# Patient Record
Sex: FEMALE | Race: WHITE | Employment: FULL TIME | URBAN - METROPOLITAN AREA
[De-identification: names, ages, dates, MRNs, and addresses within clinical notes are randomized per-mention and may not be internally consistent; named-entity substitution may affect disease eponyms.]

---

## 2017-01-13 NOTE — PROGRESS NOTES
46 y.o. white female who presents for RPE    She's trying her best with the diet, still not much exercise. Weight is actually going up as below    Vitals 1/17/2017 8/9/2016 2/8/2016   Weight 219 lb 218 lb 210 lb     Denies GI or  issues. She's intol of 4 statins and has significant Fhx heart disease. The zetia is not getting her to target, pcsk9 not covered by insurance    Denies polyuria, polydipsia, nocturia, vision change. Not checking sugars at this time.   She started metformin at the last visit and no sfx to report    Past Medical History   Diagnosis Date    Allergic rhinitis      s/p desensitization Dr. Rafael Livingston of shoulder, left, adhesive     Cervical disc herniation      C5-C7 Dr. Trujillo Backbone Cervical spondylosis     Chondromalacia of right patella     CTS (carpal tunnel syndrome)      right    Depression     FHx: heart disease     Gastritis      Dr. Amber Estevez EGD 2009 w gr 1 esophagitis    GERD (gastroesophageal reflux disease)      s/p dilation Dr. Amber Estevez 2012    Hyperlipidemia      famillial hypercholesterolemia    Hypertension     IFG (impaired fasting glucose) 12/13    Kidney stones     Mixed headache 10/11    Morbid obesity (HCC)      peak weight 220 lbs, bmi 37.9 from 9/13; qsymia (9/13-3/15) and lost approx 15% wt; belviq no help; not contrave candidate due to narc use    Myofascial pain     Osteopenia     Plantar fasciitis      left heel    Prediabetes      on metformin 11/15    Sleep apnea 2007     and PLMD Dr. Costello Bone now seeing Dr Marion Christensen; AHI 20, on APAP for years, BiPAP titration 10/15 optimal pressure 11/7 cm      Past Surgical History   Procedure Laterality Date    Hx appendectomy      Hx cholecystectomy      Hx gyn       cervical surgery    Pr cardiac surg procedure unlist  2004     thallium negative    Hx urological       s/p urethral dilation    Hx knee arthroscopy  10/13     Right knee    Hx orthopaedic  2012     R CT release Dr. Sousa Hx colonoscopy       Dr. 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      Social History Main Topics    Smoking status: Former Smoker    Smokeless tobacco: Never Used    Alcohol use No    Drug use: No    Sexual activity: Not on file     Other Topics Concern    Not on file     Social History Narrative     Allergies   Allergen Reactions    Crestor [Rosuvastatin] Myalgia    Lipitor [Atorvastatin] Other (comments)     Feels \"cramps\"    Pravastatin Myalgia    Sulfa (Sulfonamide Antibiotics) Itching    Verapamil Other (comments)     Bad taste, headache    Zocor [Simvastatin] Myalgia     Current Outpatient Prescriptions   Medication Sig    ezetimibe (ZETIA) 10 mg tablet Take 1 Tab by mouth daily.  evolocumab (REPATHA SURECLICK) pen injection 1 mL by SubCUTAneous route every fourteen (14) days.  metFORMIN ER (GLUCOPHAGE XR) 500 mg tablet Take 2 Tabs by mouth daily (with dinner).  lisinopril-hydrochlorothiazide (PRINZIDE, ZESTORETIC) 10-12.5 mg per tablet TAKE 1 TABLET BY MOUTH EVERY DAY    valACYclovir (VALTREX) 1 gram tablet TAKE 1 TABLET BY MOUTH TWICE A DAY    traMADol (ULTRAM) 50 mg tablet Take 1 Tab by mouth every six (6) hours as needed for Pain. Max Daily Amount: 200 mg.  cholecalciferol, vitamin D3, (VITAMIN D3) 2,000 unit tab Take  by mouth daily.  esomeprazole (NEXIUM) 40 mg capsule Take 20 mg by mouth daily.  gabapentin (NEURONTIN) 300 mg tablet Take 300 mg by mouth two (2) times a day. Patient takes 900mg in AM and 1200mg in PM    DULoxetine (CYMBALTA) 60 mg capsule Take 30 mg by mouth daily.  Armodafinil (NUVIGIL) 250 mg Tab Take 150 mg by mouth daily.      No current facility-administered medications for this visit.      REVIEW OF SYSTEMS: FNP Duard Shows 3/13, mammo 2013, colo 12/12 Dr Caesar Rosales  Ophtho - no vision change or eye pain  Cardiac - no CP, PND, orthopnea, edema, palpitations or syncope  Resp - no wheezing, chronic coughing, dyspnea  GI - no heartburn, nausea, vomiting, change in bowel habits, bleeding, hemorrhoids  Urinary - no dysuria, hematuria, flank pain, urgency, frequency  Genitals - no genital lesions, discharge, masses, ulceration, warts  Ortho - no swelling, dec ROM, myalgias  Derm - no nail abnormalities, rashes, lesions of note, hair loss  Psych - denies any anxiety or depression symptoms, no hallucinations or violent ideation  Constitutional - no wt loss, night sweats, unexplained fevers  Endo - no polyuria, polydipsia, nocturia, hot flashes    Visit Vitals    /80    Pulse 98    Temp 98.6 °F (37 °C) (Oral)    Ht 5' 4\" (1.626 m)    Wt 219 lb (99.3 kg)    SpO2 98%    BMI 37.59 kg/m2     Affect is appropriate. Mood stable  A&O x3  No apparent distress  Sinuses tender to percussison w fairly boggy mucosa  Op w plaques bilaterally, shotty nontender LN noted submandibular areas  Anicteric, no JVD, adenopathy or thyromegaly. No carotid bruits or radiated murmur  Lungs clear to auscultation, no wheezes or rales  Heart showed regular rate and rhythm. No murmur, rubs, gallops  Abdomen soft nontender, no hepatosplenomegaly or masses. Extremities without edema.   Pulses 1-2+ symmetrically    LABS  From 5/10 showed   gluc 93,   cr 0.90,              alt 39,                        chol 195, tg 98,   hdl 72, ldl-c 103,  wbc 7.4, hb 12.4, plt 246, tsh 1.23  From 9/11 showed   gluc 95,   cr 0.62, gfr 107, alt 25,           ldl-p 1617,                                                         wbc 6.5, hb 12.3, plt 295, nikia neg, ra neg, esr 11, vit d 44.2  From 11/11 showed gluc 84,   cr 0.63, gfr 106,                    ck 51, navya 4.0  From 4/12 showed   gluc 94,   cr 0.69,      alt 26,           ldl-p 1197, chol 176, tg 59,   hdl 73, ldl-c 91  From 7/12 showed   gluc 78,   cr 0.80, gfr>60,  alt 47  From 5/13 showed                   ldl-p 985,   chol 202, tg 46,  hdl 74, ldl-c 119  From 12/13 showed gluc 102, cr 0.78, gfr 89,   alt 23,                chol 258, tg 95,   hdl 57, ldl-c 182,  wbc 6.3, hb 12.4, plt 311, ua neg  From 4/14 showed         hba1c 6.2,      chol 183, tg 90,   hdl 66, ldl-c 99  From 10/14 showed         hba1c 5.7,      chol 262, tg 79,   hdl 58, ldl-c 188   From 3/15 showed   gluc 91,   cr 0.69, gfr 101, alt 13,       chol 226, tg 78,   hdl 67, ldl-c 143  From 7/15 showed         hba1c 5.9,      chol 265, tg 95,   hdl 68, ldl-c 178  From 9/15 showed   gluc 100, cr 0.62, gfr>60,  alt 12,                   wbc 7.1, hb 12.8, plt 286, tsh 0.78, proBNP 49  From 11/15 showed gluc 92,   cr 0.75, gfr 92,   alt 22, hba1c 6.3,      chol 267, tg 93,   hdl 74, ldl-c 174,         nikia neg, rf neg, esr 3, uric 5.5  From 7/16 showed   gluc 96,   cr 0.66, gfr>60,  alt 40, hba1c 5.8,      chol 319, tg 116, hdl 68, ldl-c 228    Results for orders placed or performed during the hospital encounter of 12/17/16   LIPID PANEL   Result Value Ref Range    LIPID PROFILE          Cholesterol, total 295 (H) <200 MG/DL    Triglyceride 121 <150 MG/DL    HDL Cholesterol 69 (H) 40 - 60 MG/DL    LDL, calculated 201.8 (H) 0 - 100 MG/DL    VLDL, calculated 24.2 MG/DL    CHOL/HDL Ratio 4.3 0 - 5.0     HEMOGLOBIN A1C W/O EAG   Result Value Ref Range    Hemoglobin A1c 5.9 (H) 4.2 - 5.6 %   METABOLIC PANEL, COMPREHENSIVE   Result Value Ref Range    Sodium 140 136 - 145 mmol/L    Potassium 4.4 3.5 - 5.5 mmol/L    Chloride 105 100 - 108 mmol/L    CO2 27 21 - 32 mmol/L    Anion gap 8 3.0 - 18 mmol/L    Glucose 98 74 - 99 mg/dL    BUN 16 7.0 - 18 MG/DL    Creatinine 0.72 0.6 - 1.3 MG/DL    BUN/Creatinine ratio 22 (H) 12 - 20      GFR est AA >60 >60 ml/min/1.73m2    GFR est non-AA >60 >60 ml/min/1.73m2    Calcium 9.1 8.5 - 10.1 MG/DL Bilirubin, total 0.4 0.2 - 1.0 MG/DL    ALT 47 13 - 56 U/L    AST 22 15 - 37 U/L    Alk. phosphatase 87 45 - 117 U/L    Protein, total 7.0 6.4 - 8.2 g/dL    Albumin 3.7 3.4 - 5.0 g/dL    Globulin 3.3 2.0 - 4.0 g/dL    A-G Ratio 1.1 0.8 - 1.7       Patient Active Problem List   Diagnosis Code    Myofascial pain syndrome Dr. Breanna Amaro M79.1    FER/PLMD Dr. Pravin Rivers 2007 now Dr. Murphy Soto AHI 20 on BiPAP G47.33    Depression F32.9    IFG (impaired fasting glucose) R73.01    Essential hypertension I10    Gastroesophageal reflux disease without esophagitis K21.9    Ureteral stone N20.1    Familial hypercholesterolemia E78.01    Obesity (BMI 30-39. 9) E66.9     ASSESSMENT AND PLAN  1. Obesity. Lifestyle and dietary modification, exercise. Declined med supervised wt loss, bariatrics  2. Depression. Continue current  3. Hypertension. Continue current  4.  Cervical disc disease, myofascial pain syndrome. F/U with her specialists, continue current  5. FER/PLMD.  F/U Dr. Murphy Soto  6. Familial hyperchol. Trial of pitava although no guarantees it will be covered or that she'll even tolerate  7. IFG. Wt loss, dietary and lifestyle measures. Continue current regimen. RTC 7/17    Above conditions discussed at length and patient vocalized understanding.   All questions answered to patient satifaction

## 2017-01-17 ENCOUNTER — HOSPITAL ENCOUNTER (OUTPATIENT)
Dept: LAB | Age: 52
Discharge: HOME | End: 2017-01-17
Payer: COMMERCIAL

## 2017-01-17 ENCOUNTER — OFFICE VISIT (OUTPATIENT)
Dept: INTERNAL MEDICINE CLINIC | Age: 52
End: 2017-01-17

## 2017-01-17 VITALS
DIASTOLIC BLOOD PRESSURE: 80 MMHG | SYSTOLIC BLOOD PRESSURE: 120 MMHG | HEART RATE: 98 BPM | OXYGEN SATURATION: 98 % | TEMPERATURE: 98.6 F | WEIGHT: 219 LBS | BODY MASS INDEX: 37.39 KG/M2 | HEIGHT: 64 IN

## 2017-01-17 DIAGNOSIS — M79.18 MYOFASCIAL PAIN: ICD-10-CM

## 2017-01-17 DIAGNOSIS — R73.01 IFG (IMPAIRED FASTING GLUCOSE): ICD-10-CM

## 2017-01-17 DIAGNOSIS — I10 ESSENTIAL HYPERTENSION: ICD-10-CM

## 2017-01-17 DIAGNOSIS — Z00.00 PHYSICAL EXAM: Primary | ICD-10-CM

## 2017-01-17 DIAGNOSIS — Z11.59 NEED FOR HEPATITIS C SCREENING TEST: ICD-10-CM

## 2017-01-17 DIAGNOSIS — E66.9 OBESITY (BMI 30-39.9): ICD-10-CM

## 2017-01-17 DIAGNOSIS — E78.01 FAMILIAL HYPERCHOLESTEROLEMIA: ICD-10-CM

## 2017-01-17 PROCEDURE — 86803 HEPATITIS C AB TEST: CPT | Performed by: INTERNAL MEDICINE

## 2017-01-17 PROCEDURE — 36415 COLL VENOUS BLD VENIPUNCTURE: CPT | Performed by: INTERNAL MEDICINE

## 2017-01-17 NOTE — PROGRESS NOTES
1. Have you been to the ER, urgent care clinic or hospitalized since your last visit? NO.     2. Have you seen or consulted any other health care providers outside of the [facility] since your last visit (Include any pap smears or colon screening)? NO      Do you have an Advanced Directive? NO    Would you like information on Advanced Directives?  YES

## 2017-01-17 NOTE — MR AVS SNAPSHOT
Visit Information Date & Time Provider Department Dept. Phone Encounter #  
 1/17/2017  1:45 PM Jennifer Hinojosa MD Internist of [address] [id number] Your Appointments 5/18/2017  2:00 PM  
Office Visit with Jennifer Hinojosa MD  
Internist of [address] [place] [address] [facility]) Appt Note: 4 month follow up  
 [address] [facility], [address] [place] [address] [facility]  
  
   
 [address] [facility] Date Due Hepatitis C Screening 1965 DTaP/Tdap/Td series (1 - Tdap) 1/2/1991 PAP AKA CERVICAL CYTOLOGY 7/17/2016 INFLUENZA AGE 9 TO ADULT 8/1/2016 BREAST CANCER SCRN MAMMOGRAM 8/19/2018 COLONOSCOPY 2/17/2022 Allergies as of 1/17/2017  Review Complete On: 8/9/2016 By: Jennifer Hinojosa MD  
  
 Severity Noted Reaction Type Reactions Crestor [Rosuvastatin]  10/24/2014    Myalgia Lipitor [Atorvastatin]    Other (comments) Feels \"cramps\" Pravastatin  11/18/2015    Myalgia Sulfa (Sulfonamide Antibiotics)    Itching Verapamil    Other (comments) Bad taste, headache Zocor [Simvastatin]  07/09/2015    Myalgia Current Immunizations  Reviewed on 4/16/2014 Name Date Influenza Vaccine 10/5/2014, 12/1/2013 Pneumococcal Polysaccharide (PPSV-23) 12/18/2015 10:39 AM  
 TD Vaccine 1/1/1991 Not reviewed this visit You Were Diagnosed With   
  
 Codes Comments Need for hepatitis C screening test    -  Primary ICD-10-CM: Z11.59 
ICD-9-CM: V73.89 Vitals BP Pulse Temp Height(growth percentile) Weight(growth percentile) SpO2  
 120/80 98 98.6 °F (37 °C) (Oral) 5' 4\" (1.626 m) 219 lb (99.3 kg) 98% BMI OB Status Smoking Status 37.59 kg/m2 Having regular periods Former Smoker Vitals History BMI and BSA Data Body Mass Index Body Surface Area  
 37.59 kg/m 2 2.12 m 2 Preferred Pharmacy Pharmacy Name Phone [id number] [id number] [phone] Your Updated Medication List  
  
   
This list is accurate as of: 1/17/17  2:39 PM.  Always use your most recent med list.  
  
  
  
  
 CYMBALTA 60 mg capsule Generic drug:  DULoxetine Take 30 mg by mouth daily. evolocumab pen injection Commonly known as:  Jackie Dominion 1 mL by SubCUTAneous route every fourteen (14) days. ezetimibe 10 mg tablet Commonly known as:  Lizeth Bautista Take 1 Tab by mouth daily. gabapentin 300 mg tablet Commonly known as:  NEURONTIN Take 300 mg by mouth two (2) times a day. Patient takes 900mg in AM and 1200mg in PM  
  
 lisinopril-hydroCHLOROthiazide 10-12.5 mg per tablet Commonly known as:  PRINZIDE, ZESTORETIC  
TAKE 1 TABLET BY MOUTH EVERY DAY  
  
 metFORMIN  mg tablet Commonly known as:  GLUCOPHAGE XR Take 2 Tabs by mouth daily (with dinner). NexIUM 40 mg capsule Generic drug:  esomeprazole Take 20 mg by mouth daily. NUVIGIL 250 mg Tab tablet Generic drug:  Armodafinil Take 150 mg by mouth daily. traMADol 50 mg tablet Commonly known as:  ULTRAM  
Take 1 Tab by mouth every six (6) hours as needed for Pain. Max Daily Amount: 200 mg.  
  
 valACYclovir 1 gram tablet Commonly known as:  VALTREX  
TAKE 1 TABLET BY MOUTH TWICE A DAY  
  
 VITAMIN D3 2,000 unit Tab Generic drug:  cholecalciferol (vitamin D3) Take  by mouth daily. To-Do List   
 03/04/2017 10:00 AM  
  Appointment with [facility] RM 2 at [address] [place] [address] ([phone]) GENERAL INSTRUCTIONS  Bring information (ID card) if you have any medically implanted devices. You will be required to lie still while the procedure is being performed. Remove any jewelry (including body piercing, hairpins) prior to MRI.   If you have had a creatinine level drawn within the past 30 days, please bring most recent results to your appt.  Bring any films, CD's, and reports related to your study with you on the day of your exam.  This only includes studies done outside of [address] [place] [address], [facility], [place] and [place]. Bring a complete list of all medications you are currently taking to include prescriptions, over-the-counter meds, herbals, vitamins & any dietary supplements. If you were given medications for claustrophobia or anxiety, please arrange to have someone drive you to your appointment. QUESTIONS  Notify the MRI Department if you have any questions concerning your study. [place] - [phone] [address] [phone] [facility]! Dear Tabby Mcnulty: Thank you for requesting a [employer] account. Our records indicate that you already have an active [employer] account. You can access your account anytime at [email or web address] true[x] [email or web address] Did you know that you can access your hospital and ER discharge instructions at any time in [employer]? You can also review all of your test results from your hospital stay or ER visit. Additional Information If you have questions, please visit the Frequently Asked Questions section of the [employer] website at [email or web address][x] [email or web address] Remember, [employer] is NOT to be used for urgent needs. For medical emergencies, dial 911. Now available from your iPhone and Android! Please provide this summary of care documentation to your next provider. Your primary care clinician is listed as Shant Fleming. If you have any questions after today's visit, please call [phone].

## 2017-01-18 LAB
HCV AB SER IA-ACNC: 0.02 INDEX
HCV AB SERPL QL IA: NEGATIVE
HCV COMMENT,HCGAC: NORMAL

## 2017-01-19 ENCOUNTER — TELEPHONE (OUTPATIENT)
Dept: INTERNAL MEDICINE CLINIC | Age: 52
End: 2017-01-19

## 2017-01-19 NOTE — TELEPHONE ENCOUNTER
CAMPOS, just wanted to let her know that her RX for Livalo was approved by plan from 1/19/17-1/19/2018.

## 2017-01-19 NOTE — PROGRESS NOTES
Tried to do PA, they require a letter of medical necessity to be sent in by MD. Dr Hamida Link wrote a letter, faxed to plan for approval

## 2017-03-04 ENCOUNTER — HOSPITAL ENCOUNTER (OUTPATIENT)
Age: 52
Discharge: HOME | End: 2017-03-04
Attending: OTOLARYNGOLOGY
Payer: COMMERCIAL

## 2017-03-04 DIAGNOSIS — R93.0 ABNORMAL HEAD MRI: ICD-10-CM

## 2017-03-04 PROCEDURE — 70553 MRI BRAIN STEM W/O & W/DYE: CPT

## 2017-03-04 PROCEDURE — 74011250636 HC RX REV CODE- 250/636: Performed by: OTOLARYNGOLOGY

## 2017-03-04 PROCEDURE — A9585 GADOBUTROL INJECTION: HCPCS | Performed by: OTOLARYNGOLOGY

## 2017-03-04 RX ORDER — SODIUM CHLORIDE 0.9 % (FLUSH) 0.9 %
5-10 SYRINGE (ML) INJECTION
Status: DISCONTINUED | OUTPATIENT
Start: 2017-03-04 | End: 2017-03-08 | Stop reason: HOSPADM

## 2017-03-04 RX ADMIN — GADOBUTROL 10 ML: 604.72 INJECTION INTRAVENOUS at 10:30

## 2017-03-04 RX ADMIN — Medication 10 ML: at 12:00

## 2017-04-17 DIAGNOSIS — R73.01 IFG (IMPAIRED FASTING GLUCOSE): ICD-10-CM

## 2017-04-17 RX ORDER — METFORMIN HYDROCHLORIDE 500 MG/1
1000 TABLET, EXTENDED RELEASE ORAL
Qty: 60 TAB | Refills: 5 | Status: SHIPPED | OUTPATIENT
Start: 2017-04-17 | End: 2017-10-18 | Stop reason: SDUPTHER

## 2017-06-01 ENCOUNTER — OFFICE VISIT (OUTPATIENT)
Dept: INTERNAL MEDICINE CLINIC | Age: 52
End: 2017-06-01

## 2017-06-17 ENCOUNTER — HOSPITAL ENCOUNTER (OUTPATIENT)
Dept: LAB | Age: 52
Discharge: HOME | End: 2017-06-17
Payer: COMMERCIAL

## 2017-06-17 DIAGNOSIS — R73.01 IFG (IMPAIRED FASTING GLUCOSE): ICD-10-CM

## 2017-06-17 DIAGNOSIS — E78.01 FAMILIAL HYPERCHOLESTEROLEMIA: ICD-10-CM

## 2017-06-17 LAB
ALBUMIN SERPL BCP-MCNC: 3.7 G/DL (ref 3.4–5)
ALBUMIN/GLOB SERPL: 1.2 {RATIO} (ref 0.8–1.7)
ALP SERPL-CCNC: 76 U/L (ref 45–117)
ALT SERPL-CCNC: 35 U/L (ref 13–56)
AST SERPL W P-5'-P-CCNC: 16 U/L (ref 15–37)
BILIRUB DIRECT SERPL-MCNC: <0.1 MG/DL (ref 0–0.2)
BILIRUB SERPL-MCNC: 0.2 MG/DL (ref 0.2–1)
CHOLEST SERPL-MCNC: 278 MG/DL
GLOBULIN SER CALC-MCNC: 3 G/DL (ref 2–4)
HBA1C MFR BLD: 6 % (ref 4.2–5.6)
HDLC SERPL-MCNC: 63 MG/DL (ref 40–60)
HDLC SERPL: 4.4 {RATIO} (ref 0–5)
LDLC SERPL CALC-MCNC: 191.4 MG/DL (ref 0–100)
LIPID PROFILE,FLP: ABNORMAL
PROT SERPL-MCNC: 6.7 G/DL (ref 6.4–8.2)
TRIGL SERPL-MCNC: 118 MG/DL (ref ?–150)
VLDLC SERPL CALC-MCNC: 23.6 MG/DL

## 2017-06-17 PROCEDURE — 80061 LIPID PANEL: CPT | Performed by: INTERNAL MEDICINE

## 2017-06-17 PROCEDURE — 80076 HEPATIC FUNCTION PANEL: CPT | Performed by: INTERNAL MEDICINE

## 2017-06-17 PROCEDURE — 83036 HEMOGLOBIN GLYCOSYLATED A1C: CPT | Performed by: INTERNAL MEDICINE

## 2017-06-17 PROCEDURE — 36415 COLL VENOUS BLD VENIPUNCTURE: CPT | Performed by: INTERNAL MEDICINE

## 2017-06-21 PROBLEM — E78.5 HYPERLIPIDEMIA: Status: ACTIVE | Noted: 2017-06-21

## 2017-06-22 NOTE — PROGRESS NOTES
46 y.o. white female who presents for RPE    She's trying her best with the diet, still not much exercise. She has not fairly frequent BLACK when climbing up the stairs. No cp, pnd, edema, coughing, wheezing. Very concerned with her strong Fhx chd.      Vitals 6/23/2017 1/17/2017 8/9/2016 2/8/2016   Weight 220 lb 219 lb 218 lb 210 lb     Denies GI or  issues. She's overdue to see NP Tae Islas    She's intol of 4 statins, zetia did not get her to target, livalo too expensive, she did get the pcsk9 briefly but too expensive so did not stay on it    Denies polyuria, polydipsia, nocturia, vision change. Not checking sugars at this time.       Past Medical History:   Diagnosis Date    Allergic rhinitis     s/p desensitization Dr. Tom Colin Bursitis of shoulder, left, adhesive     Cervical disc herniation     C5-C7 Dr. Micki Mesa Cervical spondylosis     Chondromalacia of right patella     CTS (carpal tunnel syndrome)     right    Depression     FHx: heart disease     Gastritis     Dr. Tobi Benítez EGD 2009 w gr 1 esophagitis    GERD (gastroesophageal reflux disease)     s/p dilation Dr. Tobi Benítez 2012    Hyperlipidemia     possible famillial hypercholesterolemia    Hypertension     Kidney stones     Mixed headache 10/11    Morbid obesity ([address]     peak weight 220 lbs, bmi 37.9 from 9/13; qsymia (9/13-3/15) and lost approx 15% wt; belviq no help; not contrave candidate due to narc use    Myofascial pain     Osteopenia     Plantar fasciitis     left heel    Prediabetes     on metformin 11/15    Sleep apnea 2007    and PLMD Dr. Dhruv Chowdary now seeing Dr Guillermo Lopez; AHI 20, on APAP for years, BiPAP titration 10/15 optimal pressure 11/7 cm      Past Surgical History:   Procedure Laterality Date    CARDIAC SURG PROCEDURE UNLIST  2004    thallium negative    [place] COLONOSCOPY      Dr. Tobi Benítez 12/12 negative    HX GI  2006    CT abd/pelvis negative    HX GYN      cervical surgery    HX KNEE ARTHROSCOPY  10/13    Right knee    HX ORTHOPAEDIC  2012    R CT release Dr. 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    Smoking status: Former Smoker    Smokeless tobacco: Never Used    Alcohol use No    Drug use: No    Sexual activity: Not on file     Other Topics Concern    Not on file     Social History Narrative     Allergies   Allergen Reactions    Crestor [Rosuvastatin] Myalgia    Lipitor [Atorvastatin] Other (comments)     Feels \"cramps\"    Pravastatin Myalgia    Sulfa (Sulfonamide Antibiotics) Itching    Verapamil Other (comments)     Bad taste, headache    Zocor [Simvastatin] Myalgia     Current Outpatient Prescriptions   Medication Sig    metFORMIN ER (GLUCOPHAGE XR) 500 mg tablet Take 2 Tabs by mouth daily (with dinner).  lisinopril-hydrochlorothiazide (PRINZIDE, ZESTORETIC) 10-12.5 mg per tablet TAKE 1 TABLET BY MOUTH EVERY DAY    valACYclovir (VALTREX) 1 gram tablet TAKE 1 TABLET BY MOUTH TWICE A DAY    traMADol (ULTRAM) 50 mg tablet Take 1 Tab by mouth every six (6) hours as needed for Pain. Max Daily Amount: 200 mg.  cholecalciferol, vitamin D3, (VITAMIN D3) 2,000 unit tab Take  by mouth daily.  esomeprazole (NEXIUM) 40 mg capsule Take 20 mg by mouth daily.  gabapentin (NEURONTIN) 300 mg tablet Take 300 mg by mouth two (2) times a day. Patient takes 900mg in AM and 1200mg in PM    DULoxetine (CYMBALTA) 60 mg capsule Take 30 mg by mouth daily.  Armodafinil (NUVIGIL) 250 mg Tab Take 150 mg by mouth daily. No current facility-administered medications for this visit.       REVIEW OF SYSTEMS: FNP Osmanirian Niece 3/13, mammo 8/16, colo 12/12 Dr Ashraf Doing  Ophtho - no vision change or eye pain  Cardiac - no CP, PND, orthopnea, edema, palpitations or syncope  Resp - no wheezing, chronic coughing, dyspnea  GI - no heartburn, nausea, vomiting, change in bowel habits, bleeding, hemorrhoids  Urinary - no dysuria, hematuria, flank pain, urgency, frequency  Genitals - no genital lesions, discharge, masses, ulceration, warts  Ortho - no swelling, dec ROM, myalgias  Derm - no nail abnormalities, rashes, lesions of note, hair loss  Psych - denies any anxiety or depression symptoms, no hallucinations or violent ideation  Constitutional - no wt loss, night sweats, unexplained fevers  Endo - no polyuria, polydipsia, nocturia, hot flashes    Visit Vitals    /68    Pulse 90    Temp 98.3 °F (36.8 °C) (Oral)    Ht 5' 4\" (1.626 m)    Wt 220 lb (99.8 kg)    SpO2 97%    BMI 37.76 kg/m2     Affect is appropriate. Mood stable  A&O x3  No apparent distress  Sinuses tender to percussison w fairly boggy mucosa  Op w plaques bilaterally, shotty nontender LN noted submandibular areas  Anicteric, no JVD, adenopathy or thyromegaly. No carotid bruits or radiated murmur  Lungs clear to auscultation, no wheezes or rales  Heart showed regular rate and rhythm. No murmur, rubs, gallops  Abdomen soft nontender, no hepatosplenomegaly or masses. Extremities without edema.   Pulses 1-2+ symmetrically    LABS  From 5/10 showed   gluc 93,   cr 0.90,              alt 39,                        chol 195, tg 98,   hdl 72, ldl-c 103,  wbc 7.4, hb 12.4, plt 246, tsh 1.23  From 9/11 showed   gluc 95,   cr 0.62, gfr 107, alt 25,           ldl-p 1617,                                                         wbc 6.5, hb 12.3, plt 295, nikia neg, ra neg, esr 11, vit d 44.2  From 11/11 showed gluc 84,   cr 0.63, gfr 106,                    ck 51, navya 4.0  From 4/12 showed   gluc 94,   cr 0.69,      alt 26,           ldl-p 1197, chol 176, tg 59,   hdl 73, ldl-c 91  From 7/12 showed   gluc 78,   cr 0.80, gfr>60,  alt 47  From 5/13 showed ldl-p 985,   chol 202, tg 46,  hdl 74, ldl-c 119  From 12/13 showed gluc 102, cr 0.78, gfr 89,   alt 23,                chol 258, tg 95,   hdl 57, ldl-c 182,  wbc 6.3, hb 12.4, plt 311, ua neg  From 4/14 showed         hba1c 6.2,      chol 183, tg 90,   hdl 66, ldl-c 99  From 10/14 showed         hba1c 5.7,      chol 262, tg 79,   hdl 58, ldl-c 188   From 3/15 showed   gluc 91,   cr 0.69, gfr 101, alt 13,       chol 226, tg 78,   hdl 67, ldl-c 143  From 7/15 showed         hba1c 5.9,      chol 265, tg 95,   hdl 68, ldl-c 178  From 9/15 showed   gluc 100, cr 0.62, gfr>60,  alt 12,                   wbc 7.1, hb 12.8, plt 286, tsh 0.78, proBNP 49  From 11/15 showed gluc 92,   cr 0.75, gfr 92,   alt 22, hba1c 6.3,      chol 267, tg 93,   hdl 74, ldl-c 174,         nikia neg, rf neg, esr 3, uric 5.5  From 12/15 showed                     ua neg   From 7/16 showed   gluc 96,   cr 0.66, gfr>60,  alt 40, hba1c 5.8,      chol 319, tg 116, hdl 68, ldl-c 228,           From 1/17 showed   gluc 98,   cr 0.72, gfr>60,  alt 47, hba1c 5.9,      chol 295, tg 121, hdl 69, ldl-c 202,         hep c neg    Results for orders placed or performed during the hospital encounter of 06/17/17   LIPID PANEL   Result Value Ref Range    LIPID PROFILE          Cholesterol, total 278 (H) <200 MG/DL    Triglyceride 118 <150 MG/DL    HDL Cholesterol 63 (H) 40 - 60 MG/DL    LDL, calculated 191.4 (H) 0 - 100 MG/DL    VLDL, calculated 23.6 MG/DL    CHOL/HDL Ratio 4.4 0 - 5.0     HEPATIC FUNCTION PANEL   Result Value Ref Range    Protein, total 6.7 6.4 - 8.2 g/dL    Albumin 3.7 3.4 - 5.0 g/dL    Globulin 3.0 2.0 - 4.0 g/dL    A-G Ratio 1.2 0.8 - 1.7      Bilirubin, total 0.2 0.2 - 1.0 MG/DL    Bilirubin, direct <0.1 0.0 - 0.2 MG/DL    Alk.  phosphatase 76 45 - 117 U/L    AST (SGOT) 16 15 - 37 U/L    ALT (SGPT) 35 13 - 56 U/L   HEMOGLOBIN A1C W/O EAG   Result Value Ref Range    Hemoglobin A1c 6.0 (H) 4.2 - 5.6 %     EKG showed nsr, nl intervals, no acute changes    Patient Active Problem List   Diagnosis Code    Myofascial pain syndrome Dr. Roseanne Colon M79.1    FER/PLMD Dr. Brent Rosas 2007 now Dr. Elizabeth Barrera AHI 20 on BiPAP G47.33    Depression F32.9    IFG (impaired fasting glucose) R73.01    Essential hypertension I10    Gastroesophageal reflux disease without esophagitis K21.9    Ureteral stone N20.1    Obesity (BMI 30-39. 9) E66.9    Hyperlipidemia E78.5     ASSESSMENT AND PLAN  1. Obesity. Lifestyle and dietary modification, exercise. Declined med supervised wt loss, bariatrics  2. Depression. Continue current  3. Hypertension. Continue current  4.  Cervical disc disease, myofascial pain syndrome. F/U with her specialists, continue current  5. FER/PLMD.  F/U Dr. Elizabeth Barrera  6. Familial hypercholesterolemia, probable. Apparently new programs for repatha, will see if she qualifies  7. IFG. Wt loss, dietary and lifestyle measures. Continue current regimen. 8.  Exertional dyspnea. Stress thallium scheduled. Asa 81 qd        RTC 11/17    Above conditions discussed at length and patient vocalized understanding.   All questions answered to patient satifaction

## 2017-06-23 ENCOUNTER — OFFICE VISIT (OUTPATIENT)
Dept: INTERNAL MEDICINE CLINIC | Age: 52
End: 2017-06-23

## 2017-06-23 VITALS
TEMPERATURE: 98.3 F | HEIGHT: 64 IN | SYSTOLIC BLOOD PRESSURE: 120 MMHG | DIASTOLIC BLOOD PRESSURE: 68 MMHG | HEART RATE: 90 BPM | OXYGEN SATURATION: 97 % | BODY MASS INDEX: 37.56 KG/M2 | WEIGHT: 220 LBS

## 2017-06-23 DIAGNOSIS — I10 ESSENTIAL HYPERTENSION: Primary | ICD-10-CM

## 2017-06-23 DIAGNOSIS — E66.9 OBESITY (BMI 30-39.9): ICD-10-CM

## 2017-06-23 DIAGNOSIS — G47.33 OSA (OBSTRUCTIVE SLEEP APNEA): ICD-10-CM

## 2017-06-23 DIAGNOSIS — R06.09 EXERTIONAL DYSPNEA: ICD-10-CM

## 2017-06-23 DIAGNOSIS — R73.01 IFG (IMPAIRED FASTING GLUCOSE): ICD-10-CM

## 2017-06-23 DIAGNOSIS — E78.01 FAMILIAL HYPERCHOLESTEROLEMIA: ICD-10-CM

## 2017-06-23 NOTE — PROGRESS NOTES
1. Have you been to the ER, urgent care clinic or hospitalized since your last visit? NO.     2. Have you seen or consulted any other health care providers outside of the [address] [place] [address] since your last visit (Include any pap smears or colon screening)? NO      Do you have an Advanced Directive? NO    Would you like information on Advanced Directives?  YES

## 2017-06-23 NOTE — MR AVS SNAPSHOT
Visit Information Date & Time Provider Department Dept. Phone Encounter #  
 6/23/2017  1:00 PM Deng Taylor MD Internist of [address] [id number] Your Appointments 12/20/2017 10:20 AM  
Office Visit with Deng Taylor MD  
Internist of [address] [facility]) Appt Note: 6 mo f/u  
 [address] [facility], [address] [address] [address] [place]  
  
   
 [address], [address] Upcoming Health Maintenance Date Due DTaP/Tdap/Td series (1 - Tdap) 1/2/1991 PAP AKA CERVICAL CYTOLOGY 7/17/2016 INFLUENZA AGE 9 TO ADULT 8/1/2017 BREAST CANCER SCRN MAMMOGRAM 8/19/2018 COLONOSCOPY 2/17/2022 Allergies as of 6/23/2017  Review Complete On: 6/21/2017 By: Deng Taylor MD  
  
 Severity Noted Reaction Type Reactions Crestor [Rosuvastatin]  10/24/2014    Myalgia Lipitor [Atorvastatin]    Other (comments) Feels \"cramps\" Pravastatin  11/18/2015    Myalgia Sulfa (Sulfonamide Antibiotics)    Itching Verapamil    Other (comments) Bad taste, headache Zocor [Simvastatin]  07/09/2015    Myalgia Current Immunizations  Reviewed on 6/21/2017 Name Date Influenza Vaccine 10/1/2016, 10/1/2015, 10/5/2014, 12/1/2013 Pneumococcal Polysaccharide (PPSV-23) 12/18/2015 10:39 AM  
 TD Vaccine 1/1/1991 Reviewed by Deng Taylor MD on 6/21/2017 at 10:37 PM  
You Were Diagnosed With   
  
 Codes Comments Essential hypertension    -  Primary ICD-10-CM: I10 
ICD-9-CM: 401.9 Vitals BP Pulse Temp Height(growth percentile) Weight(growth percentile) SpO2  
 120/68 90 98.3 °F (36.8 °C) (Oral) 5' 4\" (1.626 m) 220 lb (99.8 kg) 97% BMI OB Status Smoking Status 37.76 kg/m2 Having regular periods Former Smoker Vitals History BMI and BSA Data Body Mass Index Body Surface Area  
 37.76 kg/m 2 2.12 m 2 Preferred Pharmacy Pharmacy Name Phone [id number] Mccallum [address] [phone] Your Updated Medication List  
  
   
This list is accurate as of: 6/23/17  2:08 PM.  Always use your most recent med list.  
  
  
  
  
 CYMBALTA 60 mg capsule Generic drug:  DULoxetine Take 30 mg by mouth daily. gabapentin 300 mg tablet Commonly known as:  NEURONTIN Take 300 mg by mouth two (2) times a day. Patient takes 900mg in AM and 1200mg in PM  
  
 lisinopril-hydroCHLOROthiazide 10-12.5 mg per tablet Commonly known as:  PRINZIDE, ZESTORETIC  
TAKE 1 TABLET BY MOUTH EVERY DAY  
  
 metFORMIN  mg tablet Commonly known as:  GLUCOPHAGE XR Take 2 Tabs by mouth daily (with dinner). NexIUM 40 mg capsule Generic drug:  esomeprazole Take 20 mg by mouth daily. NUVIGIL 250 mg Tab tablet Generic drug:  Armodafinil Take 150 mg by mouth daily. traMADol 50 mg tablet Commonly known as:  ULTRAM  
Take 1 Tab by mouth every six (6) hours as needed for Pain. Max Daily Amount: 200 mg.  
  
 valACYclovir 1 gram tablet Commonly known as:  VALTREX  
TAKE 1 TABLET BY MOUTH TWICE A DAY  
  
 VITAMIN D3 2,000 unit Tab Generic drug:  cholecalciferol (vitamin D3) Take  by mouth daily. We Performed the Following AMB POC EKG ROUTINE W/ 12 LEADS, INTER & REP [30246 CPT(R)] Introducing [facility]! Dear Yuliana Don: Thank you for requesting a [facility] account. Our records indicate that you already have an active [facility] account. You can access your account anytime at [email or web address] Did you know that you can access your hospital and ER discharge instructions at any time in [facility]? You can also review all of your test results from your hospital stay or ER visit. Additional Information If you have questions, please visit the Frequently Asked Questions section of the [employer] website at [email or web address] Remember, [facility] [employer] is NOT to be used for urgent needs. For medical emergencies, dial 911. Now available from your iPhone and Android! Please provide this summary of care documentation to your next provider. Your primary care clinician is listed as Addis Curran. If you have any questions after today's visit, please call [phone].

## 2017-07-03 ENCOUNTER — HOSPITAL ENCOUNTER (OUTPATIENT)
Dept: NON INVASIVE DIAGNOSTICS | Age: 52
Discharge: HOME | End: 2017-07-03
Attending: INTERNAL MEDICINE
Payer: COMMERCIAL

## 2017-07-03 DIAGNOSIS — R06.09 EXERTIONAL DYSPNEA: ICD-10-CM

## 2017-07-03 LAB
ATTENDING PHYSICIAN, CST07: NORMAL
DIAGNOSIS, 93000: NORMAL
DUKE TM SCORE RESULT, CST14: NORMAL
DUKE TREADMILL SCORE, CST13: NORMAL
ECG INTERP BEFORE EX, CST11: NORMAL
ECG INTERP DURING EX, CST12: NORMAL
FUNCTIONAL CAPACITY, CST17: NORMAL
KNOWN CARDIAC CONDITION, CST08: NORMAL
MAX. DIASTOLIC BP, CST04: 62 MMHG
MAX. HEART RATE, CST05: 148 BPM
MAX. SYSTOLIC BP, CST03: 158 MMHG
OVERALL BP RESPONSE TO EXERCISE, CST16: NORMAL
OVERALL HR RESPONSE TO EXERCISE, CST15: NORMAL
PEAK EX METS, CST10: 10.1 METS
PROTOCOL NAME, CST01: NORMAL
TEST INDICATION, CST09: NORMAL

## 2017-07-03 PROCEDURE — 78452 HT MUSCLE IMAGE SPECT MULT: CPT

## 2017-07-03 PROCEDURE — A9500 TC99M SESTAMIBI: HCPCS

## 2017-07-03 PROCEDURE — 93017 CV STRESS TEST TRACING ONLY: CPT

## 2017-07-03 PROCEDURE — 74011250636 HC RX REV CODE- 250/636: Performed by: INTERNAL MEDICINE

## 2017-07-03 RX ORDER — SODIUM CHLORIDE 9 MG/ML
250 INJECTION, SOLUTION INTRAVENOUS ONCE
Status: COMPLETED | OUTPATIENT
Start: 2017-07-03 | End: 2017-07-03

## 2017-07-03 RX ADMIN — SODIUM CHLORIDE 250 ML/HR: 900 INJECTION, SOLUTION INTRAVENOUS at 09:30

## 2017-07-03 NOTE — PROGRESS NOTES
Patient was given 10.1 mCi of Sestemibi for the Resting pictures. Was stressed on the Treadmill and given 31.9 mCi of Sestemibi of the Stress pictures. Armband was removed and disposed of before the patient left.

## 2017-07-04 NOTE — PROCEDURES
[address] STRESS    Name:  Diane Dugan  MR#:  [id number]  :  1965  Account #:  [de-identified]  Date of Adm:  2017  Date of Service:  2017      PROCEDURE: Pharmacological cardiac nuclear stress test.    INDICATIONS: Dyspnea. BASELINE EKG: Sinus rhythm. Low voltage QRS. PROTOCOL: The patient exercised according to Yfn protocol for 8  minutes 0 seconds and achieved workload of 10.1 METs. Resting heart  rate of 80 beats per minute and increased to 148 beats per minute,  which is 88% of maximal age-predicted heart rate. Resting blood  pressure 128/80 mmHg, increased to 158/62 mmHg. The patient did  not have chest pain. EKG FINDINGS: Rare PVCs. No changes consistent with ischemia. No clear findings. Left ventricular systolic function is hyperdynamic and  calculated at 86%. No evidence of transient ischemic dilatation or  regional wall motion abnormalities. No evidence of ischemia or  previous infarction. IMPRESSION:  1. Low risk exercise nuclear stress test.  2. Hyperdynamic left ventricular systolic function with calculated ejection  fraction of 86%. 3. No evidence of ischemia or previous infarct based on perfusion  imaging. 4. Reasonall functional capacity with patient exercising 8 minutes  without chest pain. Target heart rate was achieved.         EVETTE Ramsey / Ileana Alves  D:  2017   12:01  T:  2017   20:55  Job #:  [id number]

## 2017-07-06 ENCOUNTER — TELEPHONE (OUTPATIENT)
Dept: INTERNAL MEDICINE CLINIC | Age: 52
End: 2017-07-06

## 2017-07-07 NOTE — TELEPHONE ENCOUNTER
pls call    IMPRESSION:  1. Low risk exercise nuclear stress test.  2. Hyperdynamic left ventricular systolic function with calculated ejection  fraction of 86%. 3. No evidence of ischemia or previous infarct based on perfusion  imaging. 4. Reasonall functional capacity with patient exercising 8 minutes  without chest pain. Target heart rate was achieved.     Probably from deconditioning  Start exercise program and slowly ramp up

## 2017-07-10 RX ORDER — LISINOPRIL AND HYDROCHLOROTHIAZIDE 10; 12.5 MG/1; MG/1
TABLET ORAL
Qty: 90 TAB | Refills: 3 | Status: SHIPPED | OUTPATIENT
Start: 2017-07-10 | End: 2018-07-27 | Stop reason: SDUPTHER

## 2017-07-12 ENCOUNTER — TELEPHONE (OUTPATIENT)
Dept: INTERNAL MEDICINE CLINIC | Age: 52
End: 2017-07-12

## 2017-07-12 NOTE — TELEPHONE ENCOUNTER
LMTCB, we need to know what pharmacy she wants the [address] sent to, I sent the insurance verification to [address] ready and they just need to know what pharmacy she will get it through

## 2017-07-12 NOTE — TELEPHONE ENCOUNTER
[facility]

## 2017-07-19 NOTE — TELEPHONE ENCOUNTER
Darcie from [facility] called Re:[facility]  on 07/11 faxed over a custom criteria form- fill out and [facility]

## 2017-07-19 NOTE — TELEPHONE ENCOUNTER
I did...... Artist Shahla White in her media on 7/12, please resend.  Wanda filled this thing out like 4 times already

## 2017-07-27 NOTE — TELEPHONE ENCOUNTER
[facility] called, they are still waiting for the form we sent to [facility], can we re fax this to repatha ready at [phone] please, they are still having issues getting [facility]

## 2017-07-28 ENCOUNTER — TELEPHONE (OUTPATIENT)
Dept: INTERNAL MEDICINE CLINIC | Age: 52
End: 2017-07-28

## 2017-07-28 NOTE — TELEPHONE ENCOUNTER
Darcie with [facility] is calling. She reached out to [employer]. The prior auth that was submitted is still pending. They are missing pages 2 and 3 of the questionnaire we received.

## 2017-08-07 ENCOUNTER — TELEPHONE (OUTPATIENT)
Dept: INTERNAL MEDICINE CLINIC | Age: 52
End: 2017-08-07

## 2017-08-11 NOTE — TELEPHONE ENCOUNTER
Vivi Felix calling with [employer] [employer]. The prior auth that was submitted is missing pages 2&3.  Would like to get those pages faxed over [phone]

## 2017-08-11 NOTE — TELEPHONE ENCOUNTER
Peri printed out from media what needed to faxed and I have now refaxed it with a note that this is all we have and if they need another form completed, they need to fax it to us because note sure what more info they need

## 2017-08-11 NOTE — TELEPHONE ENCOUNTER
We have literally sent this thing like 4 times to them, I dont know what else to do. The last time we sent it to them was on 8/4. There are no other pages to this form.    MR, please send it to the fax below

## 2017-08-15 NOTE — TELEPHONE ENCOUNTER
Darcie with [facility] [employer] is calling again. They are still missing pages 2 and 3 from the prior auth that was completed. I advised her we have sent everything we have. She has reached out to [facility] and they are refaxing us the forms.  Forms need to be filled out and faxed to Gladys Marroquin at [phone]

## 2017-10-18 DIAGNOSIS — R73.01 IFG (IMPAIRED FASTING GLUCOSE): ICD-10-CM

## 2017-10-18 RX ORDER — METFORMIN HYDROCHLORIDE 500 MG/1
TABLET, EXTENDED RELEASE ORAL
Qty: 60 TAB | Refills: 5 | Status: SHIPPED | OUTPATIENT
Start: 2017-10-18 | End: 2018-03-18 | Stop reason: SDUPTHER

## 2018-01-30 ENCOUNTER — OFFICE VISIT (OUTPATIENT)
Dept: INTERNAL MEDICINE CLINIC | Age: 53
End: 2018-01-30

## 2018-01-30 VITALS
HEART RATE: 78 BPM | TEMPERATURE: 98.4 F | BODY MASS INDEX: 37.56 KG/M2 | DIASTOLIC BLOOD PRESSURE: 82 MMHG | RESPIRATION RATE: 16 BRPM | SYSTOLIC BLOOD PRESSURE: 130 MMHG | HEIGHT: 64 IN | OXYGEN SATURATION: 99 % | WEIGHT: 220 LBS

## 2018-01-30 DIAGNOSIS — J22 ACUTE RESPIRATORY INFECTION: Primary | ICD-10-CM

## 2018-01-30 RX ORDER — VALACYCLOVIR HYDROCHLORIDE 1 G/1
TABLET, FILM COATED ORAL
Qty: 20 TAB | Refills: 2 | Status: SHIPPED | OUTPATIENT
Start: 2018-01-30 | End: 2019-05-28 | Stop reason: SDUPTHER

## 2018-01-30 RX ORDER — MONTELUKAST SODIUM 10 MG/1
10 TABLET ORAL
COMMUNITY

## 2018-01-30 RX ORDER — ALBUTEROL SULFATE 90 UG/1
1-2 AEROSOL, METERED RESPIRATORY (INHALATION)
Qty: 1 INHALER | Refills: 0 | Status: SHIPPED | OUTPATIENT
Start: 2018-01-30 | End: 2018-08-07

## 2018-01-30 RX ORDER — HYDROCODONE POLISTIREX AND CHLORPHENIRAMINE POLISTIREX 10; 8 MG/5ML; MG/5ML
5 SUSPENSION, EXTENDED RELEASE ORAL
Qty: 120 ML | Refills: 0 | Status: SHIPPED | OUTPATIENT
Start: 2018-01-30 | End: 2018-03-19

## 2018-01-30 NOTE — MR AVS SNAPSHOT
Phoebe Caal 
 
 
 [address] [facility], [address] 
[phone] Patient: Yesenia Coulter MRN: [id number] :1965 Visit Information Date & Time Provider Department Dept. Phone Encounter #  
 2018  1:30 PM Shaneka Joy, [address] [facility]  Upcoming Health Maintenance Date Due DTaP/Tdap/Td series (1 - Tdap) 1991 PAP AKA CERVICAL CYTOLOGY 2016 Influenza Age 5 to Adult 2017 BREAST CANCER SCRN MAMMOGRAM 2018 COLONOSCOPY 2022 Allergies as of 2018  Review Complete On: 2018 By: Reilly Juárez LPN Severity Noted Reaction Type Reactions Crestor [Rosuvastatin]  10/24/2014    Myalgia Lipitor [Atorvastatin]    Other (comments) Feels \"cramps\" Pravastatin  2015    Myalgia Sulfa (Sulfonamide Antibiotics)    Itching Verapamil    Other (comments) Bad taste, headache Zocor [Simvastatin]  2015    Myalgia Current Immunizations  Reviewed on 2017 Name Date Influenza Vaccine 10/1/2016, 10/1/2015, 10/5/2014, 2013 Pneumococcal Polysaccharide (PPSV-23) 2015 10:39 AM  
 TD Vaccine 1991 Not reviewed this visit You Were Diagnosed With   
  
 Codes Comments Acute respiratory infection    -  Primary ICD-10-CM: Julio Cdaiana Arpan ICD-9-CM: 519.8 Vitals BP Pulse Temp Resp Height(growth percentile) Weight(growth percentile) 130/82 (BP 1 Location: Left arm, BP Patient Position: Sitting) 78 98.4 °F (36.9 °C) (Oral) 16 5' 4\" (1.626 m) 220 lb (99.8 kg) LMP SpO2 BMI OB Status Smoking Status 2017 (Approximate) 99% 37.76 kg/m2 Menopause Former Smoker Vitals History BMI and BSA Data Body Mass Index Body Surface Area  
 37.76 kg/m 2 2.12 m 2 Preferred Pharmacy Pharmacy Name Phone  [phone] [address] AT [facility] OF [address] [facility] [address] [facility] Your Updated Medication List  
  
   
This list is accurate as of: 1/30/18  1:59 PM.  Always use your most recent med list.  
  
  
  
  
 albuterol 90 mcg/actuation inhaler Commonly known as:  PROVENTIL HFA, VENTOLIN HFA, PROAIR HFA Take 1-2 Puffs by inhalation every four (4) hours as needed for Wheezing. chlorpheniramine-HYDROcodone 10-8 mg/5 mL suspension Commonly known as:  Dk Seip Take 5 mL by mouth every twelve (12) hours as needed for Cough. Max Daily Amount: 10 mL. CYMBALTA 60 mg capsule Generic drug:  DULoxetine Take 30 mg by mouth daily. gabapentin 300 mg tablet Commonly known as:  NEURONTIN Take 300 mg by mouth two (2) times a day. Patient takes 900mg in AM and 1200mg in PM  
  
 inhalational spacing device 1 Each by Does Not Apply route as needed. lisinopril-hydroCHLOROthiazide 10-12.5 mg per tablet Commonly known as:  PRINZIDE, ZESTORETIC  
TAKE 1 TABLET BY MOUTH EVERY DAY  
  
 metFORMIN  mg tablet Commonly known as:  GLUCOPHAGE XR  
TAKE 2 TABLETS BY MOUTH DAILY WITH DINNER NexIUM 40 mg capsule Generic drug:  esomeprazole Take 20 mg by mouth daily. NUVIGIL 250 mg Tab tablet Generic drug:  Armodafinil Take 150 mg by mouth daily. SINGULAIR 10 mg tablet Generic drug:  montelukast  
Take 10 mg by mouth nightly. traMADol 50 mg tablet Commonly known as:  ULTRAM  
Take 1 Tab by mouth every six (6) hours as needed for Pain. Max Daily Amount: 200 mg.  
  
 valACYclovir 1 gram tablet Commonly known as:  VALTREX  
TAKE 1 TABLET BY MOUTH TWICE A DAY  
  
 VITAMIN D3 2,000 unit Tab Generic drug:  cholecalciferol (vitamin D3) Take  by mouth daily. Prescriptions Printed Refills  
 chlorpheniramine-HYDROcodone (TUSSIONEX) 10-8 mg/5 mL suspension 0 Sig: Take 5 mL by mouth every twelve (12) hours as needed for Cough. Max Daily Amount: 10 mL. Class: Print Route: Oral  
  
Prescriptions Sent to Pharmacy Refills  
 valACYclovir (VALTREX) 1 gram tablet 2 Sig: TAKE 1 TABLET BY MOUTH TWICE A DAY Class: Normal  
 Pharmacy: [facility] [address] [place] [address] [address] AT [address] Ph #: [phone]  
 albuterol (PROVENTIL HFA, VENTOLIN HFA, PROAIR HFA) 90 mcg/actuation inhaler 0 Sig: Take 1-2 Puffs by inhalation every four (4) hours as needed for Wheezing. Class: Normal  
 Pharmacy: [facility] [address] [place] [address] [address] AT [address] Ph #: [phone] Route: Inhalation  
 inhalational spacing device 0 Si Each by Does Not Apply route as needed. Class: Normal  
 Pharmacy: [facility] [place] [address] AT [address] Ph #: [phone] Route: Does Not Apply Introducing [facility]! Dear Alejandro Akers: Thank you for requesting a [facility] account. Our records indicate that you already have an active [facility] account. You can access your account anytime at [email or web address] Did you know that you can access your hospital and ER discharge instructions at any time in [employer]? You can also review all of your test results from your hospital stay or ER visit. Additional Information If you have questions, please visit the Frequently Asked Questions section of the [employer] website at [email or web address] Remember, [employer] is NOT to be used for urgent needs. For medical emergencies, dial 911. Now available from your iPhone and Android! Please provide this summary of care documentation to your next provider. Your primary care clinician is listed as Adenike Wu. If you have any questions after today's visit, please call [phone].

## 2018-01-30 NOTE — PROGRESS NOTES
1. Have you been to the ER, urgent care clinic or hospitalized since your last visit? NO.     2. Have you seen or consulted any other health care providers outside of the [address] [place] [address] since your last visit (Include any pap smears or colon screening)? NO      Do you have an Advanced Directive? NO    Would you like information on Advanced Directives?  NO

## 2018-01-30 NOTE — PROGRESS NOTES
HPI/History  Hermes Coulter is a 45 y.o.  female who presents for evaluation. For 6 days, pt reports paroxysmal cough which can produce clear to white sputum and nonpurulent nasal drainage. Admits to wheeze. No fevers, aches, chills, or rigors. Exposures at work (endoscopies/colonoscopies). No other sxs or complaints. Patient Active Problem List   Diagnosis Code    Myofascial pain syndrome Dr. Darnell Thornton M79.1    FER/PLMD Dr. Lissa Godinez 2007 now Dr. Allyssa Ding AHI 20 on BiPAP G47.33    Depression F32.9    IFG (impaired fasting glucose) R73.01    Essential hypertension I10    Gastroesophageal reflux disease without esophagitis K21.9    Ureteral stone N20.1    Obesity (BMI 30-39. 9) E66.9    Hyperlipidemia E78.5     Past Medical History:   Diagnosis Date    Allergic rhinitis     s/p desensitization Dr. Velasquez Cruz Bursitis of shoulder, left, adhesive     Cervical disc herniation     C5-C7 Dr. Erinn Dunn Cervical spondylosis     Chondromalacia of right patella     CTS (carpal tunnel syndrome)     right    Depression     FHx: heart disease     Gastritis     Dr. Marleny Mckinney EGD 2009 w gr 1 esophagitis    GERD (gastroesophageal reflux disease)     s/p dilation Dr. Marleny Mckinney 2012    Hyperlipidemia     possible famillial hypercholesterolemia by [place] criteria (4)    Hypertension     Kidney stones     Mixed headache 10/11    Morbid obesity (HCC)     peak weight 220 lbs, bmi 37.9 from 9/13; qsymia (9/13-3/15) and lost approx 15% wt; belviq no help; not contrave candidate due to narc use    Myofascial pain     Osteopenia     Plantar fasciitis     left heel    Prediabetes     on metformin 11/15    Sleep apnea 2007    and PLMD Dr. Lissa Godinez now seeing Dr Kayley Rosario; AHI 20, on APAP for years, BiPAP titration 10/15 optimal pressure 11/7 cm      Past Surgical History:   Procedure Laterality Date    CARDIAC SURG PROCEDURE UNLIST      thallium 2004 negative; nuclear stress test neg ef 86% 7/17    HX APPENDECTOMY      [place] COLONOSCOPY      Dr. Eduar Zamora 12/12 negative    HX GI  2006    CT abd/pelvis negative    HX GYN      cervical surgery    HX KNEE ARTHROSCOPY  10/13    Right knee    HX ORTHOPAEDIC  2012    R CT release Dr. 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      Social History Main Topics    Smoking status: Former Smoker    Smokeless tobacco: Never Used    Alcohol use No    Drug use: No    Sexual activity: Not on file     Other Topics Concern    Not on file     Social History Narrative     Family History   Problem Relation Age of Onset    Heart Disease Father     Diabetes Father     Hypertension Father     Liver Disease Father     Heart Disease Mother 46     congestive heart failure    Cancer Maternal Grandmother      breast    Breast Cancer Maternal Grandmother     Hypertension Brother     Heart Disease Brother 46     3 bothers w CHD    Arthritis-rheumatoid Sister      Current Outpatient Prescriptions   Medication Sig    valACYclovir (VALTREX) 1 gram tablet TAKE 1 TABLET BY MOUTH TWICE A DAY    montelukast (SINGULAIR) 10 mg tablet Take 10 mg by mouth nightly.  albuterol (PROVENTIL HFA, VENTOLIN HFA, PROAIR HFA) 90 mcg/actuation inhaler Take 1-2 Puffs by inhalation every four (4) hours as needed for Wheezing.  inhalational spacing device 1 Each by Does Not Apply route as needed.  chlorpheniramine-HYDROcodone (TUSSIONEX) 10-8 mg/5 mL suspension Take 5 mL by mouth every twelve (12) hours as needed for Cough. Max Daily Amount: 10 mL.     metFORMIN ER (GLUCOPHAGE XR) 500 mg tablet TAKE 2 TABLETS BY MOUTH DAILY WITH DINNER    lisinopril-hydroCHLOROthiazide (PRINZIDE, ZESTORETIC) 10-12.5 mg per tablet TAKE 1 TABLET BY MOUTH EVERY DAY    traMADol (ULTRAM) 50 mg tablet Take 1 Tab by mouth every six (6) hours as needed for Pain. Max Daily Amount: 200 mg.  cholecalciferol, vitamin D3, (VITAMIN D3) 2,000 unit tab Take  by mouth daily.  esomeprazole (NEXIUM) 40 mg capsule Take 20 mg by mouth daily.  gabapentin (NEURONTIN) 300 mg tablet Take 300 mg by mouth two (2) times a day. Patient takes 900mg in AM and 1200mg in PM    DULoxetine (CYMBALTA) 60 mg capsule Take 30 mg by mouth daily.  Armodafinil (NUVIGIL) 250 mg Tab Take 150 mg by mouth daily. No current facility-administered medications for this visit. Allergies   Allergen Reactions    Crestor [Rosuvastatin] Myalgia    Lipitor [Atorvastatin] Other (comments)     Feels \"cramps\"    Pravastatin Myalgia    Sulfa (Sulfonamide Antibiotics) Itching    Verapamil Other (comments)     Bad taste, headache    Zocor [Simvastatin] Myalgia       Review of Systems  Aside from those included in HPI, remainder of complete ROS negative. Physical Examination  Visit Vitals    /82 (BP 1 Location: Left arm, BP Patient Position: Sitting)    Pulse 78    Temp 98.4 °F (36.9 °C) (Oral)    Resp 16    Ht 5' 4\" (1.626 m)    Wt 220 lb (99.8 kg)    LMP 05/30/2017 (Approximate)    SpO2 99%    BMI 37.76 kg/m2       General - Alert and in no acute distress. Pt appears well, comfortable, and in good spirits. Pleasant, engaging. Nontoxic. Not anxious, non-diaphoretic. Mental status - Appropriate mood, behavior, speech content, dress, and thought processes. Eyes - Pupils equal and reactive, extraocular movements intact. No erythema or discharge. Ears - Auditory canals appear normal.  TMs appear normal.  Nose - No erythema. No rhinorrhea. Mouth - Mucous membranes moist. Pharynx without lesions, swelling, erythema, or exudate. Neck - Supple without rigidity. Lymph - No periauricular, perimandibular, or cervical tenderness or swelling.   Pulm - No cough outside of when prompted during exam. Full, complete sentences. No tachypnea, retractions, or cyanosis. Good respiratory effort. Single area of mild expiratory wheeze in right upper anterior field. No change after prompted cough. No other appreciable wheeze. No rales or rhonchi. Cardiovascular - Normal rate, regular rhythm. No appreciable murmurs or gallops. Assessment and Plan  1. Acute respiratory infection - Appears viral; no signs of bacterial process. Fluids, albuterol with spacer, and tussionex as night but will use OTC suppressants during day if needed. Reference material provided and discussed AE/SE/interactions regarding tussionex. Discussed course and prognosis. Return/call if needed. Further planning as warranted. Pt happily agrees with plan. PLEASE NOTE:   This document has been produced using voice recognition software. Unrecognized errors in transcription may be present.     [employer] [facility] [address] [place] [address]  [phone]  1/30/2018

## 2018-02-12 ENCOUNTER — OFFICE VISIT (OUTPATIENT)
Dept: INTERNAL MEDICINE CLINIC | Age: 53
End: 2018-02-12

## 2018-02-12 VITALS
OXYGEN SATURATION: 97 % | HEIGHT: 64 IN | SYSTOLIC BLOOD PRESSURE: 110 MMHG | DIASTOLIC BLOOD PRESSURE: 82 MMHG | RESPIRATION RATE: 14 BRPM | HEART RATE: 78 BPM | BODY MASS INDEX: 37.73 KG/M2 | TEMPERATURE: 98.6 F | WEIGHT: 221 LBS

## 2018-02-12 DIAGNOSIS — L98.9 CHEST SKIN LESION: ICD-10-CM

## 2018-02-12 DIAGNOSIS — R23.4 INDURATION OF SKIN: Primary | ICD-10-CM

## 2018-02-12 RX ORDER — GUAIFENESIN 100 MG/5ML
81 LIQUID (ML) ORAL DAILY
COMMUNITY
End: 2019-04-17

## 2018-02-12 NOTE — PROGRESS NOTES
1. Have you been to the ER, urgent care clinic or hospitalized since your last visit? NO.     2. Have you seen or consulted any other health care providers outside of the [address] [place] [address] since your last visit (Include any pap smears or colon screening)? NO      Do you have an Advanced Directive? NO    Would you like information on Advanced Directives?  NO

## 2018-02-12 NOTE — MR AVS SNAPSHOT
[address] [address] [address] 
[phone] Patient: Patricia Coulter MRN: [id number] :1965 Visit Information Date & Time Provider Department Dept. Phone Encounter #  
 2018  4:30 PM [facility] [address] [place] [address] 10 Upcoming Health Maintenance Date Due DTaP/Tdap/Td series (1 - Tdap) 1991 PAP AKA CERVICAL CYTOLOGY 2016 Influenza Age 5 to Adult 2017 BREAST CANCER SCRN MAMMOGRAM 2018 COLONOSCOPY 2022 Allergies as of 2018  Review Complete On: 2018 By: Qian Britton LPN Severity Noted Reaction Type Reactions Crestor [Rosuvastatin]  10/24/2014    Myalgia Lipitor [Atorvastatin]    Other (comments) Feels \"cramps\" Pravastatin  2015    Myalgia Sulfa (Sulfonamide Antibiotics)    Itching Verapamil    Other (comments) Bad taste, headache Zocor [Simvastatin]  2015    Myalgia Current Immunizations  Reviewed on 2017 Name Date Influenza Vaccine 10/1/2016, 10/1/2015, 10/5/2014, 2013 Pneumococcal Polysaccharide (PPSV-23) 2015 10:39 AM  
 TD Vaccine 1991 Not reviewed this visit Vitals BP Pulse Temp Resp Height(growth percentile) Weight(growth percentile) 110/82 (BP 1 Location: Left arm, BP Patient Position: Sitting) 78 98.6 °F (37 °C) (Oral) 14 5' 4\" (1.626 m) 221 lb (100.2 kg) SpO2 BMI OB Status Smoking Status 97% 37.93 kg/m2 Menopause Former Smoker Vitals History BMI and BSA Data Body Mass Index Body Surface Area  
 37.93 kg/m 2 2.13 m 2 Preferred Pharmacy Pharmacy Name Phone [phone] - [facility], [address] [facility] [address] AT [address] [phone] Your Updated Medication List  
  
   
This list is accurate as of: 18  4:55 PM.  Always use your most recent med list.  
  
  
  
  
 albuterol 90 mcg/actuation inhaler Commonly known as:  PROVENTIL HFA, VENTOLIN HFA, PROAIR HFA Take 1-2 Puffs by inhalation every four (4) hours as needed for Wheezing. aspirin 81 mg chewable tablet Take 81 mg by mouth daily. chlorpheniramine-HYDROcodone 10-8 mg/5 mL suspension Commonly known as:  Jose Brawn Take 5 mL by mouth every twelve (12) hours as needed for Cough. Max Daily Amount: 10 mL. CYMBALTA 60 mg capsule Generic drug:  DULoxetine Take 30 mg by mouth daily. gabapentin 300 mg tablet Commonly known as:  NEURONTIN Take 300 mg by mouth two (2) times a day. Patient takes 900mg in AM and 1200mg in PM  
  
 inhalational spacing device 1 Each by Does Not Apply route as needed. lisinopril-hydroCHLOROthiazide 10-12.5 mg per tablet Commonly known as:  PRINZIDE, ZESTORETIC  
TAKE 1 TABLET BY MOUTH EVERY DAY  
  
 metFORMIN  mg tablet Commonly known as:  GLUCOPHAGE XR  
TAKE 2 TABLETS BY MOUTH DAILY WITH DINNER NexIUM 40 mg capsule Generic drug:  esomeprazole Take 20 mg by mouth daily. NUVIGIL 250 mg Tab tablet Generic drug:  Armodafinil Take 150 mg by mouth daily. SINGULAIR 10 mg tablet Generic drug:  montelukast  
Take 10 mg by mouth nightly. traMADol 50 mg tablet Commonly known as:  ULTRAM  
Take 1 Tab by mouth every six (6) hours as needed for Pain. Max Daily Amount: 200 mg.  
  
 valACYclovir 1 gram tablet Commonly known as:  VALTREX  
TAKE 1 TABLET BY MOUTH TWICE A DAY  
  
 VITAMIN D3 2,000 unit Tab Generic drug:  cholecalciferol (vitamin D3) Take  by mouth daily. Introducing [facility]! Dear Areli Hyde: Thank you for requesting a SciGit account. Our records indicate that you already have an active SciGit account. You can access your account anytime at [email or web address] Did you know that you can access your hospital and ER discharge instructions at any time in CoverHound? You can also review all of your test results from your hospital stay or ER visit. Additional Information If you have questions, please visit the Frequently Asked Questions section of the CoverHound website at [email or web address] Remember, CoverHound is NOT to be used for urgent needs. For medical emergencies, dial 911. Now available from your iPhone and Android! Please provide this summary of care documentation to your next provider. Your primary care clinician is listed as Ronald Santillan. If you have any questions after today's visit, please call [phone].

## 2018-02-12 NOTE — PROGRESS NOTES
HPI/History  Wyane Coulter is a 45 y.o.  female who presents for evaluation. Pt developed tender and indurated area in the sternal region on 2/9. Slight redness at times. No streaking. No overwhelming warmth. Using warm compresses but no drainage. No fevers or constitutional sxs. Seems to be stable. Patient Active Problem List   Diagnosis Code    Myofascial pain syndrome Dr. Alison Bergeron M79.1    FER/PLMD Dr. Terald Romberg 2007 now Dr. Aguila Maldonado AHI 20 on BiPAP G47.33    Depression F32.9    IFG (impaired fasting glucose) R73.01    Essential hypertension I10    Gastroesophageal reflux disease without esophagitis K21.9    Ureteral stone N20.1    Obesity (BMI 30-39. 9) E66.9    Hyperlipidemia E78.5     Past Medical History:   Diagnosis Date    Allergic rhinitis     s/p desensitization Dr. Teresita Simmons Bursitis of shoulder, left, adhesive     Cervical disc herniation     C5-C7 Dr. Clovis Samuel Cervical spondylosis     Chondromalacia of right patella     CTS (carpal tunnel syndrome)     right    Depression     FHx: heart disease     Gastritis     Dr. Zahra Walls EGD 2009 w gr 1 esophagitis    GERD (gastroesophageal reflux disease)     s/p dilation Dr. Zahra Walls 2012    Hyperlipidemia     possible famillial hypercholesterolemia by [place] criteria (4)    Hypertension     Kidney stones     Mixed headache 10/11    Morbid obesity (HCC)     peak weight 220 lbs, bmi 37.9 from 9/13; qsymia (9/13-3/15) and lost approx 15% wt; belviq no help; not contrave candidate due to narc use    Myofascial pain     Osteopenia     Plantar fasciitis     left heel    Prediabetes     on metformin 11/15    Sleep apnea 2007    and PLMD Dr. Terald Romberg now seeing Dr Ashly Mart; AHI 20, on APAP for years, BiPAP titration 10/15 optimal pressure 11/7 cm      Past Surgical History:   Procedure Laterality Date    CARDIAC SURG PROCEDURE UNLIST      thallium 2004 negative; nuclear stress test neg ef 86% 7/17    HX APPENDECTOMY      HX CHOLECYSTECTOMY [facility] COLONOSCOPY      Dr. Som Wan 12/12 negative    HX GI  2006    CT abd/pelvis negative    HX GYN      cervical surgery    HX KNEE ARTHROSCOPY  10/13    Right knee    HX ORTHOPAEDIC  2012    R CT release Dr. 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    Smoking status: Former Smoker    Smokeless tobacco: Never Used    Alcohol use No    Drug use: No    Sexual activity: Not on file     Other Topics Concern    Not on file     Social History Narrative     Family History   Problem Relation Age of Onset    Heart Disease Father     Diabetes Father     Hypertension Father     Liver Disease Father     Heart Disease Mother 46     congestive heart failure    Cancer Maternal Grandmother      breast    Breast Cancer Maternal Grandmother     Hypertension Brother     Heart Disease Brother 46     3 bothers w CHD    Arthritis-rheumatoid Sister      Current Outpatient Prescriptions   Medication Sig    aspirin 81 mg chewable tablet Take 81 mg by mouth daily.  valACYclovir (VALTREX) 1 gram tablet TAKE 1 TABLET BY MOUTH TWICE A DAY    montelukast (SINGULAIR) 10 mg tablet Take 10 mg by mouth nightly.  albuterol (PROVENTIL HFA, VENTOLIN HFA, PROAIR HFA) 90 mcg/actuation inhaler Take 1-2 Puffs by inhalation every four (4) hours as needed for Wheezing.  inhalational spacing device 1 Each by Does Not Apply route as needed.  chlorpheniramine-HYDROcodone (TUSSIONEX) 10-8 mg/5 mL suspension Take 5 mL by mouth every twelve (12) hours as needed for Cough. Max Daily Amount: 10 mL.     metFORMIN ER (GLUCOPHAGE XR) 500 mg tablet TAKE 2 TABLETS BY MOUTH DAILY WITH DINNER    lisinopril-hydroCHLOROthiazide (PRINZIDE, ZESTORETIC) 10-12.5 mg per tablet TAKE 1 TABLET BY MOUTH EVERY DAY    traMADol (ULTRAM) 50 mg tablet Take 1 Tab by mouth every six (6) hours as needed for Pain. Max Daily Amount: 200 mg.  cholecalciferol, vitamin D3, (VITAMIN D3) 2,000 unit tab Take  by mouth daily.  esomeprazole (NEXIUM) 40 mg capsule Take 20 mg by mouth daily.  gabapentin (NEURONTIN) 300 mg tablet Take 300 mg by mouth two (2) times a day. Patient takes 900mg in AM and 1200mg in PM    DULoxetine (CYMBALTA) 60 mg capsule Take 30 mg by mouth daily.  Armodafinil (NUVIGIL) 250 mg Tab Take 150 mg by mouth daily. No current facility-administered medications for this visit. Allergies   Allergen Reactions    Crestor [Rosuvastatin] Myalgia    Lipitor [Atorvastatin] Other (comments)     Feels \"cramps\"    Pravastatin Myalgia    Sulfa (Sulfonamide Antibiotics) Itching    Verapamil Other (comments)     Bad taste, headache    Zocor [Simvastatin] Myalgia       Review of Systems  Aside from those included in HPI, remainder of ROS negative. Physical Examination  Visit Vitals    /82 (BP 1 Location: Left arm, BP Patient Position: Sitting)    Pulse 78    Temp 98.6 °F (37 °C) (Oral)    Resp 14    Ht 5' 4\" (1.626 m)    Wt 221 lb (100.2 kg)    SpO2 97%    BMI 37.93 kg/m2       General - Alert and in no acute distress. Pt appears well, comfortable, and in good spirits. Pleasant, engaging. Nontoxic. Not anxious, non-diaphoretic. Mental status - Appropriate mood, behavior, speech content, dress, and thought processes. Pulm - No tachypnea, retractions, or cyanosis. Good respiratory effort. Cardiovascular - Normal rate, regular rhythm. Skin induration at sternal region between superior aspects of breast. Slight brownish coloration but no erythema. No excessive heat. No streaking. No drainage. No palpable fluctuance. Mild tenderness. Assessment and Plan  1. Developing abscess vs cyst - I&D not required currently.  Discussed course and prognosis. We opted to monitor for now. If enlarging/collecting, she will ensure warm compresses and we will send to surg for possible I&D. Offered abx but opted to hold for now and watch closely. Discussed ominous developments and she will return/call if developments or concerns. Further planning as warranted. Pt happily agrees with plan. PLEASE NOTE:   This document has been produced using voice recognition software. Unrecognized errors in transcription may be present.     Mamadou Fajardo [employer] [facility]  [phone]  2/12/2018

## 2018-03-18 DIAGNOSIS — R73.01 IFG (IMPAIRED FASTING GLUCOSE): ICD-10-CM

## 2018-03-19 ENCOUNTER — OFFICE VISIT (OUTPATIENT)
Dept: INTERNAL MEDICINE CLINIC | Age: 53
End: 2018-03-19

## 2018-03-19 VITALS
RESPIRATION RATE: 14 BRPM | DIASTOLIC BLOOD PRESSURE: 72 MMHG | TEMPERATURE: 97.9 F | BODY MASS INDEX: 38.07 KG/M2 | HEART RATE: 87 BPM | OXYGEN SATURATION: 95 % | HEIGHT: 64 IN | WEIGHT: 223 LBS | SYSTOLIC BLOOD PRESSURE: 122 MMHG

## 2018-03-19 DIAGNOSIS — H61.899 LESION OF EAR CANAL: Primary | ICD-10-CM

## 2018-03-19 RX ORDER — OMEPRAZOLE 40 MG/1
40 CAPSULE, DELAYED RELEASE ORAL
COMMUNITY

## 2018-03-19 RX ORDER — METFORMIN HYDROCHLORIDE 500 MG/1
TABLET, EXTENDED RELEASE ORAL
Qty: 60 TAB | Refills: 2 | Status: SHIPPED | OUTPATIENT
Start: 2018-03-19 | End: 2018-06-28 | Stop reason: SDUPTHER

## 2018-03-19 NOTE — PROGRESS NOTES
1. Have you been to the ER, urgent care clinic or hospitalized since your last visit? NO.     2. Have you seen or consulted any other health care providers outside of the [address] [place] [address] since your last visit (Include any pap smears or colon screening)? NO      Do you have an Advanced Directive? NO    Would you like information on Advanced Directives?  NO

## 2018-03-19 NOTE — LETTER
NOTIFICATION RETURN TO WORK / SCHOOL 
 
3/19/2018 4:21 PM 
 
Ms. Jazz Coulter [address] [place] [address] [address] [address] [address] [address] To Whom It May Concern: 
 
Sweetie Coulter is currently under the care of [facility]. She will return to work/school on: 3/20/18 If there are questions or concerns please have the patient contact our office. Sincerely, FABIOLA Soler

## 2018-03-19 NOTE — MR AVS SNAPSHOT
[address] [address] [address] 
[phone] Patient: Sweetie Coulter MRN: [id number] :1965 Visit Information Date & Time Provider Department Dept. Phone Encounter #  
 3/19/2018  4:00 PM [facility] [phone] Upcoming Health Maintenance Date Due DTaP/Tdap/Td series (1 - Tdap) 1991 PAP AKA CERVICAL CYTOLOGY 2016 Influenza Age 5 to Adult 2017 BREAST CANCER SCRN MAMMOGRAM 2018 COLONOSCOPY 2022 Allergies as of 3/19/2018  Review Complete On: 3/19/2018 By: Crystal Shen LPN Severity Noted Reaction Type Reactions Crestor [Rosuvastatin]  10/24/2014    Myalgia Lipitor [Atorvastatin]    Other (comments) Feels \"cramps\" Pravastatin  2015    Myalgia Sulfa (Sulfonamide Antibiotics)    Itching Verapamil    Other (comments) Bad taste, headache Zocor [Simvastatin]  2015    Myalgia Current Immunizations  Reviewed on 2017 Name Date Influenza Vaccine 10/1/2016, 10/1/2015, 10/5/2014, 2013 Pneumococcal Polysaccharide (PPSV-23) 2015 10:39 AM  
 TD Vaccine 1991 Not reviewed this visit You Were Diagnosed With   
  
 Codes Comments Lesion of ear canal    -  Primary ICD-10-CM: L98.9 ICD-9-CM: 709.9 Vitals BP Pulse Temp Resp Height(growth percentile) Weight(growth percentile) 122/72 (BP 1 Location: Left arm, BP Patient Position: Sitting) 87 97.9 °F (36.6 °C) (Oral) 14 5' 4\" (1.626 m) 223 lb (101.2 kg) SpO2 BMI OB Status Smoking Status 95% 38.28 kg/m2 Menopause Former Smoker Vitals History BMI and BSA Data Body Mass Index Body Surface Area  
 38.28 kg/m 2 2.14 m 2 Preferred Pharmacy Pharmacy Name Phone [id number] [address] [phone] Your Updated Medication List  
  
   
This list is accurate as of 3/19/18  4:25 PM.  Always use your most recent med list.  
  
  
  
  
 albuterol 90 mcg/actuation inhaler Commonly known as:  PROVENTIL HFA, VENTOLIN HFA, PROAIR HFA Take 1-2 Puffs by inhalation every four (4) hours as needed for Wheezing. aspirin 81 mg chewable tablet Take 81 mg by mouth daily. chlorpheniramine-HYDROcodone 10-8 mg/5 mL suspension Commonly known as:  Olegario Marylu Take 5 mL by mouth every twelve (12) hours as needed for Cough. Max Daily Amount: 10 mL. CYMBALTA 60 mg capsule Generic drug:  DULoxetine Take 30 mg by mouth daily. gabapentin 300 mg tablet Commonly known as:  NEURONTIN Take 300 mg by mouth two (2) times a day. Patient takes 900mg in AM and 1200mg in PM  
  
 inhalational spacing device 1 Each by Does Not Apply route as needed. lisinopril-hydroCHLOROthiazide 10-12.5 mg per tablet Commonly known as:  PRINZIDE, ZESTORETIC  
TAKE 1 TABLET BY MOUTH EVERY DAY  
  
 metFORMIN  mg tablet Commonly known as:  GLUCOPHAGE XR  
TAKE 2 TABLETS BY MOUTH DAILY WITH DINNER NexIUM 40 mg capsule Generic drug:  esomeprazole Take 20 mg by mouth daily. NUVIGIL 250 mg Tab tablet Generic drug:  Armodafinil Take 150 mg by mouth daily. omeprazole 40 mg capsule Commonly known as:  PRILOSEC Take 40 mg by mouth daily. SINGULAIR 10 mg tablet Generic drug:  montelukast  
Take 10 mg by mouth nightly. traMADol 50 mg tablet Commonly known as:  ULTRAM  
Take 1 Tab by mouth every six (6) hours as needed for Pain. Max Daily Amount: 200 mg.  
  
 valACYclovir 1 gram tablet Commonly known as:  VALTREX  
TAKE 1 TABLET BY MOUTH TWICE A DAY  
  
 VITAMIN D3 2,000 unit Tab Generic drug:  cholecalciferol (vitamin D3) Take  by mouth daily. Introducing [facility]! Dear Supriya Fontenot: Thank you for requesting a Betfair account. Our records indicate that you already have an active Betfair account. You can access your account anytime at [email or web address] Did you know that you can access your hospital and ER discharge instructions at any time in Betfair? You can also review all of your test results from your hospital stay or ER visit. Additional Information If you have questions, please visit the Frequently Asked Questions section of the Betfair website at [email or web address] Remember, Betfair is NOT to be used for urgent needs. For medical emergencies, dial 911. Now available from your iPhone and Android! Please provide this summary of care documentation to your next provider. Your primary care clinician is listed as Sydnie Naylor. If you have any questions after today's visit, please call [phone].

## 2018-03-19 NOTE — PROGRESS NOTES
HPI/History  Carmen Coulter is a 45 y.o.  female who presents for evaluation. Accompanied by . Pt reports right ear pain and tenderness starting yesterday. No otorrhea or hearing disturbances. No cold/flu/allergy type sxs. No fevers. Patient Active Problem List   Diagnosis Code    Myofascial pain syndrome Dr. Nanette Valle M79.1    FER/PLMD Dr. Nazario Macias 2007 now Dr. Grace Oliver AHI 20 on BiPAP G47.33    Depression F32.9    IFG (impaired fasting glucose) R73.01    Essential hypertension I10    Gastroesophageal reflux disease without esophagitis K21.9    Ureteral stone N20.1    Obesity (BMI 30-39. 9) E66.9    Hyperlipidemia E78.5     Past Medical History:   Diagnosis Date    Allergic rhinitis     s/p desensitization Dr. Toya Marie Bursitis of shoulder, left, adhesive     Cervical disc herniation     C5-C7 Dr. Ochoa Reading Cervical spondylosis     Chondromalacia of right patella     CTS (carpal tunnel syndrome)     right    Depression     FHx: heart disease     Gastritis     Dr. Salvador Connell EGD 2009 w gr 1 esophagitis    GERD (gastroesophageal reflux disease)     s/p dilation Dr. Salvador Connell 2012    Hyperlipidemia     possible famillial hypercholesterolemia by [place] criteria (4)    Hypertension     Kidney stones     Mixed headache 10/11    Morbid obesity (HCC)     peak weight 220 lbs, bmi 37.9 from 9/13; qsymia (9/13-3/15) and lost approx 15% wt; belviq no help; not contrave candidate due to narc use    Myofascial pain     Osteopenia     Plantar fasciitis     left heel    Prediabetes     on metformin 11/15    Sleep apnea 2007    and PLMD Dr. Nazario Macias now seeing Dr Adelaida Florez; AHI 20, on APAP for years, BiPAP titration 10/15 optimal pressure 11/7 cm      Past Surgical History:   Procedure Laterality Date    CARDIAC SURG PROCEDURE UNLIST      thallium 2004 negative; nuclear stress test neg ef 86% 7/17    [facility] [place] COLONOSCOPY      Dr. Salvador Connell 12/12 negative    HX GI 2006    CT abd/pelvis negative    HX GYN      cervical surgery    HX KNEE ARTHROSCOPY  10/13    Right knee    HX ORTHOPAEDIC  2012    R CT release Dr. 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[place]      Social History Main Topics    Smoking status: Former Smoker    Smokeless tobacco: Never Used    Alcohol use No    Drug use: No    Sexual activity: Not on file     Other Topics Concern    Not on file     Social History Narrative     Family History   Problem Relation Age of Onset    Heart Disease Father     Diabetes Father     Hypertension Father     Liver Disease Father     Heart Disease Mother 46     congestive heart failure    Cancer Maternal Grandmother      breast    Breast Cancer Maternal Grandmother     Hypertension Brother     Heart Disease Brother 46     3 bothers w CHD    Arthritis-rheumatoid Sister      Current Outpatient Prescriptions   Medication Sig    metFORMIN ER (GLUCOPHAGE XR) 500 mg tablet TAKE 2 TABLETS BY MOUTH DAILY WITH DINNER    omeprazole (PRILOSEC) 40 mg capsule Take 40 mg by mouth daily.  aspirin 81 mg chewable tablet Take 81 mg by mouth daily.  valACYclovir (VALTREX) 1 gram tablet TAKE 1 TABLET BY MOUTH TWICE A DAY    montelukast (SINGULAIR) 10 mg tablet Take 10 mg by mouth nightly.  albuterol (PROVENTIL HFA, VENTOLIN HFA, PROAIR HFA) 90 mcg/actuation inhaler Take 1-2 Puffs by inhalation every four (4) hours as needed for Wheezing.  inhalational spacing device 1 Each by Does Not Apply route as needed.  lisinopril-hydroCHLOROthiazide (PRINZIDE, ZESTORETIC) 10-12.5 mg per tablet TAKE 1 TABLET BY MOUTH EVERY DAY    traMADol (ULTRAM) 50 mg tablet Take 1 Tab by mouth every six (6) hours as needed for Pain. Max Daily Amount: 200 mg.    gabapentin (NEURONTIN) 300 mg tablet Take 300 mg by mouth two (2) times a day. Patient takes 900mg in AM and 1200mg in PM    DULoxetine (CYMBALTA) 60 mg capsule Take 30 mg by mouth daily.  Armodafinil (NUVIGIL) 250 mg Tab Take 150 mg by mouth daily. No current facility-administered medications for this visit. Allergies   Allergen Reactions    Crestor [Rosuvastatin] Myalgia    Lipitor [Atorvastatin] Other (comments)     Feels \"cramps\"    Pravastatin Myalgia    Sulfa (Sulfonamide Antibiotics) Itching    Verapamil Other (comments)     Bad taste, headache    Zocor [Simvastatin] Myalgia       Review of Systems  Aside from those included in HPI, remainder of ROS negative. Physical Examination  Visit Vitals    /72 (BP 1 Location: Left arm, BP Patient Position: Sitting)    Pulse 87    Temp 97.9 °F (36.6 °C) (Oral)    Resp 14    Ht 5' 4\" (1.626 m)    Wt 223 lb (101.2 kg)    SpO2 95%    BMI 38.28 kg/m2       General - Alert and in no acute distress. Pt appears well, comfortable, and in good spirits. Pleasant, engaging. Nontoxic. Not anxious, non-diaphoretic. Mental status - Appropriate mood, behavior, speech content, dress, and thought processes. Eyes - No periorbital findings. Pupils equal and reactive, extraocular movements intact. No erythema or discharge. Ears - No tenderness with tragal/auricular manipulation. Right canal with acne-like pustule on anterior side. Tenderness noted. Pustule unroofed during otoscopic exam and drained yellowish fluid. Following drainage, the site of lesion and surrounding area was unremarkable. Left canal and TM unremarkable. No other findings. Nose - No erythema. No rhinorrhea. Mouth - Mucous membranes moist. Pharynx without lesions, swelling, erythema, or exudate. Neck - Supple without rigidity. Lymph - No periauricular, perimandibular, or cervical tenderness or swelling. Pulm - No tachypnea, retractions, or cyanosis. Good respiratory effort. Cardiovascular - Normal rate. Assessment and Plan  1. Pustular lesion of right ear canal - Drained during otoscopic exam. Site of lesion and surrounding area was unremarkable following drainage. Discussed abx (oral vs topical) but decided to hold off for now and monitor. Would likely use oral abx if needed in future. Discussed course and prognosis and potential for increased pain after today's exam. Will likely have pt return for recheck/visualization if continued issues. Further planning as warranted. Pt and  happily agree with plan. PLEASE NOTE:   This document has been produced using voice recognition software. Unrecognized errors in transcription may be present.     [employer] of [address] [place] [address]  [phone]  3/19/2018

## 2018-06-28 DIAGNOSIS — R73.01 IFG (IMPAIRED FASTING GLUCOSE): ICD-10-CM

## 2018-06-28 RX ORDER — METFORMIN HYDROCHLORIDE 500 MG/1
TABLET, EXTENDED RELEASE ORAL
Qty: 60 TAB | Refills: 2 | Status: SHIPPED | OUTPATIENT
Start: 2018-06-28 | End: 2018-10-01 | Stop reason: SDUPTHER

## 2018-07-27 RX ORDER — LISINOPRIL AND HYDROCHLOROTHIAZIDE 10; 12.5 MG/1; MG/1
TABLET ORAL
Qty: 90 TAB | Refills: 3 | Status: SHIPPED | OUTPATIENT
Start: 2018-07-27 | End: 2018-08-07 | Stop reason: SDUPTHER

## 2018-07-29 RX ORDER — LISINOPRIL AND HYDROCHLOROTHIAZIDE 10; 12.5 MG/1; MG/1
TABLET ORAL
Qty: 90 TAB | Refills: 3 | Status: SHIPPED | OUTPATIENT
Start: 2018-07-29 | End: 2018-08-07 | Stop reason: SINTOL

## 2018-08-07 ENCOUNTER — HOSPITAL ENCOUNTER (OUTPATIENT)
Dept: LAB | Age: 53
Discharge: HOME | End: 2018-08-07
Payer: COMMERCIAL

## 2018-08-07 ENCOUNTER — OFFICE VISIT (OUTPATIENT)
Dept: INTERNAL MEDICINE CLINIC | Age: 53
End: 2018-08-07

## 2018-08-07 VITALS
DIASTOLIC BLOOD PRESSURE: 90 MMHG | TEMPERATURE: 99.5 F | HEART RATE: 78 BPM | RESPIRATION RATE: 16 BRPM | HEIGHT: 64 IN | BODY MASS INDEX: 37.9 KG/M2 | SYSTOLIC BLOOD PRESSURE: 144 MMHG | OXYGEN SATURATION: 95 % | WEIGHT: 222 LBS

## 2018-08-07 DIAGNOSIS — I10 ESSENTIAL HYPERTENSION: ICD-10-CM

## 2018-08-07 DIAGNOSIS — R73.01 IFG (IMPAIRED FASTING GLUCOSE): Primary | ICD-10-CM

## 2018-08-07 DIAGNOSIS — R73.01 IFG (IMPAIRED FASTING GLUCOSE): ICD-10-CM

## 2018-08-07 DIAGNOSIS — E66.01 SEVERE OBESITY (BMI 35.0-39.9): ICD-10-CM

## 2018-08-07 PROCEDURE — 80053 COMPREHEN METABOLIC PANEL: CPT | Performed by: INTERNAL MEDICINE

## 2018-08-07 PROCEDURE — 83036 HEMOGLOBIN GLYCOSYLATED A1C: CPT | Performed by: INTERNAL MEDICINE

## 2018-08-07 PROCEDURE — 82043 UR ALBUMIN QUANTITATIVE: CPT | Performed by: INTERNAL MEDICINE

## 2018-08-07 RX ORDER — AMLODIPINE BESYLATE 5 MG/1
5 TABLET ORAL DAILY
Qty: 30 TAB | Refills: 11 | Status: SHIPPED | OUTPATIENT
Start: 2018-08-07 | End: 2019-08-14 | Stop reason: SDUPTHER

## 2018-08-07 RX ORDER — LISINOPRIL 10 MG/1
10 TABLET ORAL DAILY
Qty: 30 TAB | Refills: 11 | Status: SHIPPED | OUTPATIENT
Start: 2018-08-07 | End: 2019-08-14 | Stop reason: SDUPTHER

## 2018-08-07 NOTE — MR AVS SNAPSHOT
[facility] 
 
 
 [address], [address] 
[phone] Patient: Ender Coulter MRN: [id number] :[id number] Visit Information Date & Time Provider Department Dept. Phone Encounter #  
 2018  8:40 AM Ever Silva MD [facility] [phone] Your Appointments 2018  9:20 AM  
Office Visit with Ever Silva MD  
[facility] Appt Note: 4 month follow up  
 [address], [address] [place] [address]  
  
   
 [address], [address] Upcoming Health Maintenance Date Due DTaP/Tdap/Td series (1 - Tdap) 1991 PAP AKA CERVICAL CYTOLOGY 2016 Influenza Age 5 to Adult 2018 BREAST CANCER SCRN MAMMOGRAM 2018 COLONOSCOPY 2022 Allergies as of 2018  Review Complete On: 2018 By: Ever Silva MD  
  
 Severity Noted Reaction Type Reactions Crestor [Rosuvastatin]  10/24/2014    Myalgia Lipitor [Atorvastatin]    Myalgia Pravastatin  2015    Myalgia Sulfa (Sulfonamide Antibiotics)    Rash, Itching Verapamil    Other (comments) Bad taste, headache Zocor [Simvastatin]  2015    Myalgia Current Immunizations  Reviewed on 2017 Name Date Influenza Vaccine 10/1/2016, 10/1/2015, 10/5/2014, 2013 Pneumococcal Polysaccharide (PPSV-23) 2015 10:39 AM  
 TD Vaccine 1991 Not reviewed this visit You Were Diagnosed With   
  
 Codes Comments IFG (impaired fasting glucose)    -  Primary ICD-10-CM: R73.01 
ICD-9-CM: 790.21 Vitals BP Pulse Temp Resp Height(growth percentile) Weight(growth percentile) 144/90 78 99.5 °F (37.5 °C) (Oral) 16 5' 4\" (1.626 m) 222 lb (100.7 kg) SpO2 BMI OB Status Smoking Status 95% 38.11 kg/m2 Menopause Former Smoker Vitals History BMI and BSA Data Body Mass Index Body Surface Area  
 38.11 kg/m 2 2.13 m 2 Preferred Pharmacy Pharmacy Name Phone [facility][id number]- [address] [phone] Your Updated Medication List  
  
   
This list is accurate as of 8/7/18  9:35 AM.  Always use your most recent med list.  
  
  
  
  
 albuterol 90 mcg/actuation inhaler Commonly known as:  PROVENTIL HFA, VENTOLIN HFA, PROAIR HFA Take 1-2 Puffs by inhalation every four (4) hours as needed for Wheezing. aspirin 81 mg chewable tablet Take 81 mg by mouth daily. CYMBALTA 60 mg capsule Generic drug:  DULoxetine Take 30 mg by mouth daily. gabapentin 300 mg tablet Commonly known as:  NEURONTIN Take 300 mg by mouth two (2) times a day. Patient takes 900mg in AM and 1200mg in PM  
  
 inhalational spacing device 1 Each by Does Not Apply route as needed. lisinopril-hydroCHLOROthiazide 10-12.5 mg per tablet Commonly known as:  PRINZIDE, ZESTORETIC  
TAKE 1 TABLET BY MOUTH EVERY DAY  
  
 metFORMIN  mg tablet Commonly known as:  GLUCOPHAGE XR  
TAKE 2 TABLETS BY MOUTH DAILY WITH DINNER  
  
 NUVIGIL 250 mg Tab tablet Generic drug:  Armodafinil Take 150 mg by mouth daily. omeprazole 40 mg capsule Commonly known as:  PRILOSEC Take 40 mg by mouth daily. SINGULAIR 10 mg tablet Generic drug:  montelukast  
Take 10 mg by mouth nightly. traMADol 50 mg tablet Commonly known as:  ULTRAM  
Take 1 Tab by mouth every six (6) hours as needed for Pain. Max Daily Amount: 200 mg.  
  
 valACYclovir 1 gram tablet Commonly known as:  VALTREX  
TAKE 1 TABLET BY MOUTH TWICE A DAY To-Do List   
 08/07/2018 Lab:  HEMOGLOBIN A1C W/O EAG   
  
 08/07/2018 Lab:  METABOLIC PANEL, COMPREHENSIVE   
  
 08/07/2018 Lab:  MICROALBUMIN, UR, RAND W/ MICROALB/CREAT RATIO Introducing [facility]! Dear Heather Lazar: Thank you for requesting a [employer] account. Our records indicate that you already have an active [employer] account. You can access your account anytime at [email or web address] [email or web address] Did you know that you can access your hospital and ER discharge instructions at any time in [employer]? You can also review all of your test results from your hospital stay or ER visit. Additional Information If you have questions, please visit the Frequently Asked Questions section of the [employer] website at [email or web address] Remember, [employer] is NOT to be used for urgent needs. For medical emergencies, dial 911. Now available from your iPhone and Android! Please provide this summary of care documentation to your next provider. Your primary care clinician is listed as Alfred Ripple. If you have any questions after today's visit, please call [phone].

## 2018-08-07 NOTE — PROGRESS NOTES
52 y.o. white female who presents for evaluation    Reports no cardiovascular complaints. However, she apparently ran out of her Zestoretic and did not take it for several days and her cramps and leg aches went away. When she finally started back on it, she started having cramps again so she is asking to be switched over to a different blood pressure regimen. No success with attempts at weight loss. She has been \"a bad girl\" and has not been getting follow-up checks. She is overdue for labs. Admits that the diet is off. Denies polyuria, polydipsia, nocturia, vision change. Not checking sugars at this time. However, she is interested in hearing about the intermittent fasting program that we have implemented on a lot of our patients, she's apparently heard good things about it. Vitals 8/7/2018 3/19/2018 2/12/2018 1/30/2018 6/23/2017 1/17/2017   Weight 222 lb 223 lb 221 lb 220 lb 220 lb 219 lb     Denies GI or  issues.   Remains overdue to see NP Ryanne Arreola of 4 statins, zetia did not get her to [employer], livalo too expensive, we cannot get the PCSK9 covered so too expensive for her to take    Past Medical History:   Diagnosis Date    Allergic rhinitis     s/p desensitization Dr. Kishore Rivas Bursitis of shoulder, left, adhesive     Cervical disc herniation     C5-C7 Dr. Edi Espinoza Cervical spondylosis     Chondromalacia of right patella     CTS (carpal tunnel syndrome)     right    Depression     FHx: heart disease     Gastritis     Dr. Suni Pascual EGD 2009 w gr 1 esophagitis    GERD (gastroesophageal reflux disease)     s/p dilation Dr. Suni Pascual 2012    Hyperlipidemia     possible famillial hypercholesterolemia by [place] criteria (4)    Hypertension     Kidney stones     Mixed headache 10/11    Morbid obesity ([address]     peak weight 220 lbs, bmi 37.9 from 9/13; qsymia (9/13-3/15) and lost approx 15% wt; belviq no help; no contrave due to narc; IF 8/18    Myofascial pain     Osteopenia     Plantar fasciitis     left heel    Prediabetes     on metformin 11/15    Sleep apnea 2007    and PLMD Dr. Surekha Wilkerson now seeing Dr Shawn Gutierrez; AHI 20, on APAP for years, BiPAP titration 10/15 optimal pressure 11/7 cm      Past Surgical History:   Procedure Laterality Date    CARDIAC SURG PROCEDURE UNLIST      thallium 2004 negative; nuclear stress test neg ef 86% 7/17    HX [place] COLONOSCOPY      Dr. Nicole Dixon 12/12 negative    HX GI  2006    CT abd/pelvis negative    HX GYN      cervical surgery    HX KNEE ARTHROSCOPY  10/13    Right knee    HX ORTHOPAEDIC  2012    R CT release Dr. 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      Social History Main Topics    Smoking status: Former Smoker    Smokeless tobacco: Never Used    Alcohol use No    Drug use: No    Sexual activity: Not on file     Other Topics Concern    Not on file     Social History Narrative     Allergies   Allergen Reactions    Crestor [Rosuvastatin] Myalgia    Hydrochlorothiazide Other (comments)     Leg cramps    Lipitor [Atorvastatin] Myalgia    Pravastatin Myalgia    Sulfa (Sulfonamide Antibiotics) Rash and Itching    Verapamil Other (comments)     Bad taste, headache    Zocor [Simvastatin] Myalgia     Current Outpatient Prescriptions   Medication Sig    amLODIPine (NORVASC) 5 mg tablet Take 1 Tab by mouth daily.  lisinopril (PRINIVIL, ZESTRIL) 10 mg tablet Take 1 Tab by mouth daily.  metFORMIN ER (GLUCOPHAGE XR) 500 mg tablet TAKE 2 TABLETS BY MOUTH DAILY WITH DINNER    omeprazole (PRILOSEC) 40 mg capsule Take 40 mg by mouth daily.  aspirin 81 mg chewable tablet Take 81 mg by mouth daily.     valACYclovir (VALTREX) 1 gram tablet TAKE 1 TABLET BY MOUTH TWICE A DAY (Patient taking differently: as needed. TAKE 1 TABLET BY MOUTH TWICE A DAY)    montelukast (SINGULAIR) 10 mg tablet Take 10 mg by mouth nightly.  traMADol (ULTRAM) 50 mg tablet Take 1 Tab by mouth every six (6) hours as needed for Pain. Max Daily Amount: 200 mg.    gabapentin (NEURONTIN) 300 mg tablet Take 300 mg by mouth two (2) times a day. Patient takes 900mg in AM and 1200mg in PM    DULoxetine (CYMBALTA) 60 mg capsule Take 30 mg by mouth daily.  Armodafinil (NUVIGIL) 250 mg Tab Take 150 mg by mouth daily.  albuterol (PROVENTIL HFA, VENTOLIN HFA, PROAIR HFA) 90 mcg/actuation inhaler Take 1-2 Puffs by inhalation every four (4) hours as needed for Wheezing. (Patient taking differently: Take 1-2 Puffs by inhalation every four (4) hours as needed for Wheezing (not taking). )    inhalational spacing device 1 Each by Does Not Apply route as needed. No current facility-administered medications for this visit. REVIEW OF SYSTEMS: FNP Raven Exon 3/13, mammo 8/16, colo 12/12 Dr Christofer Kenny  Ophtho  no vision change or eye pain  Cardiac  no CP, PND, orthopnea, edema, palpitations or syncope  Resp  no wheezing, chronic coughing, dyspnea  GI  no heartburn, nausea, vomiting, change in bowel habits, bleeding, hemorrhoids  Urinary  no dysuria, hematuria, flank pain, urgency, frequency  Genitals  no genital lesions, discharge, masses, ulceration, warts  Ortho  no swelling, dec ROM, myalgias  Derm  no nail abnormalities, rashes, lesions of note, hair loss  Psych  denies any anxiety or depression symptoms, no hallucinations or violent ideation  Constitutional  no wt loss, night sweats, unexplained fevers  Endo - no polyuria, polydipsia, nocturia, hot flashes    Visit Vitals    /90    Pulse 78    Temp 99.5 °F (37.5 °C) (Oral)    Resp 16    Ht 5' 4\" (1.626 m)    Wt 222 lb (100.7 kg)    SpO2 95%    BMI 38.11 kg/m2     Affect is appropriate.   Mood stable  A&O x3  No apparent distress  Sinuses tender to percussison w fairly boggy mucosa  Op w plaques bilaterally, shotty nontender LN noted submandibular areas  Anicteric, no JVD, adenopathy or thyromegaly. No carotid bruits or radiated murmur  Lungs clear to auscultation, no wheezes or rales  Heart showed regular rate and rhythm. No murmur, rubs, gallops  Abdomen soft nontender, no hepatosplenomegaly or masses. Extremities without edema.   Pulses 1-2+ symmetrically    LABS  From 5/10 showed   gluc 93,   cr 0.90,              alt 39,                        chol 195, tg 98,   hdl 72, ldl-c 103,  wbc 7.4, hb 12.4, plt 246, tsh 1.23  From 9/11 showed   gluc 95,   cr 0.62, gfr 107, alt 25,           ldl-p 1617,                                                         wbc 6.5, hb 12.3, plt 295, nikia neg, ra neg, esr 11, vit d 44.2  From 11/11 showed gluc 84,   cr 0.63, gfr 106,                    ck 51, navya 4.0  From 4/12 showed   gluc 94,   cr 0.69,      alt 26,           ldl-p 1197, chol 176, tg 59,   hdl 73, ldl-c 91  From 7/12 showed   gluc 78,   cr 0.80, gfr>60,  alt 47  From 5/13 showed                   ldl-p 985,   chol 202, tg 46,  hdl 74, ldl-c 119  From 12/13 showed gluc 102, cr 0.78, gfr 89,   alt 23,                chol 258, tg 95,   hdl 57, ldl-c 182,  wbc 6.3, hb 12.4, plt 311, ua neg  From 4/14 showed         hba1c 6.2,      chol 183, tg 90,   hdl 66, ldl-c 99  From 10/14 showed         hba1c 5.7,      chol 262, tg 79,   hdl 58, ldl-c 188   From 3/15 showed   gluc 91,   cr 0.69, gfr 101, alt 13,       chol 226, tg 78,   hdl 67, ldl-c 143  From 7/15 showed         hba1c 5.9,      chol 265, tg 95,   hdl 68, ldl-c 178  From 9/15 showed   gluc 100, cr 0.62, gfr>60,  alt 12,                   wbc 7.1, hb 12.8, plt 286, tsh 0.78, proBNP 49  From 11/15 showed gluc 92,   cr 0.75, gfr 92,   alt 22, hba1c 6.3,      chol 267, tg 93,   hdl 74, ldl-c 174,         nikia neg, rf neg, esr 3, uric 5.5  From 12/15 showed ua neg   From 7/16 showed   gluc 96,   cr 0.66, gfr>60,  alt 40, hba1c 5.8,      chol 319, tg 116, hdl 68, ldl-c 228,           From 1/17 showed   gluc 98,   cr 0.72, gfr>60,  alt 47, hba1c 5.9,      chol 295, tg 121, hdl 69, ldl-c 202,         hep c neg  From 6/17 showed         alt 35, hba1c 6.0,      chol 278, tg 118, hdl 63, ldl-c 191  From 9/17 showed                     b12 520, fol 7.0    Patient Active Problem List   Diagnosis Code    Myofascial pain syndrome Dr. Luba Licea M79.1    FER/PLMD Dr. Jes Zamorano 2007 now Dr. Nathaly Magana AHI 20 on BiPAP G47.33    Depression F32.9    IFG (impaired fasting glucose) R73.01    Essential hypertension I10    Gastroesophageal reflux disease without esophagitis K21.9    Ureteral stone N20.1    Hyperlipidemia E78.5    Severe obesity (BMI 35.0-39.9) ([facility]) E66.01     ASSESSMENT AND PLAN  1. Obesity. Lifestyle and dietary modification, exercise. Declined med supervised wt loss, bariatrics. Intermittent fasting discussed at length. Explained the concepts in detail. Went over possible physiologic changes that could occur and how that would possibly impact her situation in a positive way. Discussed 16:8 program in particular. We also went over the differences between hunger and jessica hypoglycemia and she will need to be watchful with the diabetes. She will do some more research and consider implementing in the near future, standard handout given  2. Depression. Continue current  3. Hypertension. Change to amlo and sara, sfx discussed, call w update and she will follow bp readings at work  4. Cervical disc disease, myofascial pain syndrome. F/U with her specialists, continue current  5. FER/PLMD.  F/U Dr. Nathaly Magana  6. Familial hypercholesterolemia, probable. No pcsk9 due to insurance issues  7. IFG. Wt loss, dietary and lifestyle measures. Continue current regimen. RTC 11/17    Above conditions discussed at length and patient vocalized understanding.   All questions answered to patient satisfaction

## 2018-08-07 NOTE — TELEPHONE ENCOUNTER
Asked by provider to assist with attempting to get a PCSK9 inhibitor covered for patient. Verbal order obtained for Repatha as it appears this is a preferred medication on the [facility] Formulary (assuming this is who the patient's pharmacy benefit manager is). Medication sent to pharmacy to begin prior authorization process. Orders Placed This Encounter    evolocumab (REPATHA SURECLICK) pen injection     Si mL by SubCUTAneous route every fourteen (14) days.      Dispense:  2 Pen     Refill:  Renee Hurley, PharmD, BCPS, Hergisthang Shipley, BC-ADM

## 2018-08-07 NOTE — PROGRESS NOTES
1. Have you been to the ER, urgent care clinic or hospitalized since your last visit? NO.     2. Have you seen or consulted any other health care providers outside of the [address] [place] [address] since your last visit (Include any pap smears or colon screening)? NO      Do you have an Advanced Directive? NO    Would you like information on Advanced Directives? NO      Chief Complaint   Patient presents with    Medication Evaluation     BP med Prinzide. Per patient medication is washing her out and leg cramps off and on for a while.

## 2018-08-08 ENCOUNTER — TELEPHONE (OUTPATIENT)
Dept: INTERNAL MEDICINE CLINIC | Age: 53
End: 2018-08-08

## 2018-08-08 LAB
ALBUMIN SERPL-MCNC: 3.8 G/DL (ref 3.4–5)
ALBUMIN/GLOB SERPL: 1.3 {RATIO} (ref 0.8–1.7)
ALP SERPL-CCNC: 99 U/L (ref 45–117)
ALT SERPL-CCNC: 58 U/L (ref 13–56)
ANION GAP SERPL CALC-SCNC: 9 MMOL/L (ref 3–18)
AST SERPL-CCNC: 22 U/L (ref 15–37)
BILIRUB SERPL-MCNC: 0.3 MG/DL (ref 0.2–1)
BUN SERPL-MCNC: 12 MG/DL (ref 7–18)
BUN/CREAT SERPL: 17 (ref 12–20)
CALCIUM SERPL-MCNC: 9.1 MG/DL (ref 8.5–10.1)
CHLORIDE SERPL-SCNC: 107 MMOL/L (ref 100–108)
CO2 SERPL-SCNC: 27 MMOL/L (ref 21–32)
CREAT SERPL-MCNC: 0.71 MG/DL (ref 0.6–1.3)
CREAT UR-MCNC: 111.69 MG/DL (ref 30–125)
GLOBULIN SER CALC-MCNC: 3 G/DL (ref 2–4)
GLUCOSE SERPL-MCNC: 101 MG/DL (ref 74–99)
HBA1C MFR BLD: 5.9 % (ref 4.2–5.6)
MICROALBUMIN UR-MCNC: 5.3 MG/DL (ref 0–3)
MICROALBUMIN/CREAT UR-RTO: 47 MG/G (ref 0–30)
POTASSIUM SERPL-SCNC: 4 MMOL/L (ref 3.5–5.5)
PROT SERPL-MCNC: 6.8 G/DL (ref 6.4–8.2)
SODIUM SERPL-SCNC: 143 MMOL/L (ref 136–145)

## 2018-08-08 NOTE — TELEPHONE ENCOUNTER
pls call    Results for orders placed or performed during the hospital encounter of 08/07/18   HEMOGLOBIN A1C W/O EAG   Result Value Ref Range    Hemoglobin A1c 5.9 (H) 4.2 - 5.6 %   METABOLIC PANEL, COMPREHENSIVE   Result Value Ref Range    Sodium 143 136 - 145 mmol/L    Potassium 4.0 3.5 - 5.5 mmol/L    Chloride 107 100 - 108 mmol/L    CO2 27 21 - 32 mmol/L    Anion gap 9 3.0 - 18 mmol/L    Glucose 101 (H) 74 - 99 mg/dL    BUN 12 7.0 - 18 MG/DL    Creatinine 0.71 0.6 - 1.3 MG/DL    BUN/Creatinine ratio 17 12 - 20      GFR est AA >60 >60 ml/min/1.73m2    GFR est non-AA >60 >60 ml/min/1.73m2    Calcium 9.1 8.5 - 10.1 MG/DL    Bilirubin, total 0.3 0.2 - 1.0 MG/DL    ALT (SGPT) 58 (H) 13 - 56 U/L    AST (SGOT) 22 15 - 37 U/L    Alk.  phosphatase 99 45 - 117 U/L    Protein, total 6.8 6.4 - 8.2 g/dL    Albumin 3.8 3.4 - 5.0 g/dL    Globulin 3.0 2.0 - 4.0 g/dL    A-G Ratio 1.3 0.8 - 1.7     MICROALBUMIN, UR, RAND W/ MICROALB/CREAT RATIO   Result Value Ref Range    Microalbumin,urine random 5.30 (H) 0 - 3.0 MG/DL    Creatinine, urine 111.69 30 - 125 mg/dL    Microalbumin/Creat ratio (mg/g creat) 47 (H) 0 - 30 mg/g     a1c ok, lower than last (6.0)  umar elev - get sugar and bp down and should get better  Alt elev, likely fatty liver - again wt loss  Continue as planned and recheck in 4-6 mos

## 2018-08-08 NOTE — TELEPHONE ENCOUNTER
Asked by PCP to investigate coverage for Repatha. Medication sent to patient's pharmacy yesterday and called today to ensure coverage. Medication is covered but pharmacist was unable to tell me the copay of the medication because the medication must be dispensed from the [address] of [facility]. [facility] will reach out to the patient to provide her further information about this process and if she wants it delivered via mail order or pick it up at her local pharmacy. Called to inform patient about above information but she was unable to reached at this time. Left message for patient to call back. Alecia Wilson PharmD, BCPS, STEFANIE Lee         Update: 8/8/18 2:40PM  Patient returned call and she was given the above information. All questions answered at this time.      Geryl Herb, PharmD, BCPS, RED, BC-ADM

## 2018-08-16 ENCOUNTER — TELEPHONE (OUTPATIENT)
Dept: INTERNAL MEDICINE CLINIC | Age: 53
End: 2018-08-16

## 2018-08-16 NOTE — TELEPHONE ENCOUNTER
The patient's insurance does not cover Susan Michaud; May she take Praluent instead; If so [facility] request a new script so they can can send off a prior auth,.

## 2018-10-01 DIAGNOSIS — R73.01 IFG (IMPAIRED FASTING GLUCOSE): ICD-10-CM

## 2018-10-01 RX ORDER — METFORMIN HYDROCHLORIDE 500 MG/1
TABLET, EXTENDED RELEASE ORAL
Qty: 60 TAB | Refills: 2 | Status: SHIPPED | OUTPATIENT
Start: 2018-10-01 | End: 2018-12-03 | Stop reason: SDUPTHER

## 2018-12-03 DIAGNOSIS — R73.01 IFG (IMPAIRED FASTING GLUCOSE): ICD-10-CM

## 2018-12-03 RX ORDER — METFORMIN HYDROCHLORIDE 500 MG/1
500 TABLET, EXTENDED RELEASE ORAL
Qty: 180 TAB | Refills: 3 | Status: SHIPPED | OUTPATIENT
Start: 2018-12-03 | End: 2020-02-14

## 2018-12-10 NOTE — PROGRESS NOTES
52 y.o. white female who presents for evaluation    Reports no cardiovascular complaints. Her bp is well-controlled on regimen below running in the 110s over 70s and no more cramps off diuretic. Trying to exercise but not consistent    She's doing ok on intermittent fasting and lost about 14 lbs on her scale but regained some with the holidays and vacation    Vitals 12/11/2018 8/7/2018 3/19/2018 2/12/2018 1/30/2018   Weight 214 lb 222 lb 223 lb 221 lb 220 lb     Denies GI or  issues. Remains overdue to see NP Ana Pollock of 4 statins, zetia did not get her to [facility], livalo too expensive.   We discussed the recent pricing change in repatha    She's seeing Dr Federico Goddard for acupuncture and it has really helped her aches    IF 8/18    Past Medical History:   Diagnosis Date    Allergic rhinitis     s/p desensitization Dr. Perkins Eis of shoulder, left, adhesive     Cervical disc herniation     C5-C7 Dr. Danielle Wang Cervical spondylosis     Chondromalacia of right patella     CTS (carpal tunnel syndrome)     right    Depression     FHx: heart disease     Gastritis     Dr. Brii Menendez EGD 2009 w gr 1 esophagitis    GERD (gastroesophageal reflux disease)     s/p dilation Dr. Brii Menendez 2012    Hyperlipidemia     possible famillial hypercholesterolemia by [place] criteria (4)    Hypertension     Kidney stones     Mixed headache 10/11    Morbid obesity ([address]     peak weight 220 lbs, bmi 37.9 from 9/13; qsymia (9/13-3/15) and lost approx 15% wt; belviq no help; no contrave due to narc; IF 8/18 strat weight 222 lbs    Myofascial pain     Osteopenia     Plantar fasciitis     left heel    Prediabetes     on metformin 11/15    Sleep apnea 2007    and PLMD Dr. Francisco Ortiz now seeing Dr Nafisa Morgan; AHI 20, on APAP for years, BiPAP titration 10/15 optimal pressure 11/7 cm      Past Surgical History:   Procedure Laterality Date    CARDIAC SURG PROCEDURE UNLIST      thallium 2004 negative; nuclear stress test neg ef 86% 7/17    HX APPENDECTOMY      HX CHOLECYSTECTOMY      HX COLONOSCOPY      Dr. Diaz Query 12/12 negative    HX GI  2006    CT abd/pelvis negative    HX GYN      cervical surgery    HX KNEE ARTHROSCOPY  10/13    Right knee    HX ORTHOPAEDIC  2012    R CT release Dr. Nadja Reyna HX UROLOGICAL      s/p urethral dilation    HX UROLOGICAL  12/17/15    [facility]-Cyto/Retro/Ureteroscopy/JJ Cath     Social History     Socioeconomic History    Marital status:      Spouse name: Not on file    Number of children: 0    Years of education: Not on file    Highest education level: Not on file   Social Needs    Financial resource strain: Not on file    Food insecurity - worry: Not on file    Food insecurity - inability: Not on file   Atmail needs - medical: Not on file   Atmail needs - non-medical: Not on file   Occupational History    Occupation: [employer] Hope   Tobacco Use    Smoking status: Former Smoker    Smokeless tobacco: Never Used   Substance and Sexual Activity    Alcohol use: No    Drug use: No    Sexual activity: Not on file   Other Topics Concern    Not on file   Social History Narrative    Not on file     Allergies   Allergen Reactions    Crestor [Rosuvastatin] Myalgia    Hydrochlorothiazide Other (comments)     Leg cramps    Lipitor [Atorvastatin] Myalgia    Pravastatin Myalgia    Sulfa (Sulfonamide Antibiotics) Rash and Itching    Verapamil Other (comments)     Bad taste, headache    Zocor [Simvastatin] Myalgia     Current Outpatient Medications   Medication Sig    metFORMIN ER (GLUCOPHAGE XR) 500 mg tablet Take 1 Tab by mouth daily (with dinner).  amLODIPine (NORVASC) 5 mg tablet Take 1 Tab by mouth daily.  lisinopril (PRINIVIL, ZESTRIL) 10 mg tablet Take 1 Tab by mouth daily.  omeprazole (PRILOSEC) 40 mg capsule Take 40 mg by mouth daily.  valACYclovir (VALTREX) 1 gram tablet TAKE 1 TABLET BY MOUTH TWICE A DAY (Patient taking differently: as needed.  TAKE 1 TABLET BY MOUTH TWICE A DAY)    montelukast (SINGULAIR) 10 mg tablet Take 10 mg by mouth nightly.  traMADol (ULTRAM) 50 mg tablet Take 1 Tab by mouth every six (6) hours as needed for Pain. Max Daily Amount: 200 mg.    gabapentin (NEURONTIN) 300 mg tablet Take 300 mg by mouth two (2) times a day. Patient takes 900mg in AM and 1200mg in PM    DULoxetine (CYMBALTA) 60 mg capsule Take 30 mg by mouth daily.  Armodafinil (NUVIGIL) 250 mg Tab Take 150 mg by mouth daily.  evolocumab (REPATHA SURECLICK) pen injection 1 mL by SubCUTAneous route every fourteen (14) days. (Patient taking differently: 140 mg by SubCUTAneous route every fourteen (14) days.)    aspirin 81 mg chewable tablet Take 81 mg by mouth daily. No current facility-administered medications for this visit. REVIEW OF SYSTEMS: FNP Mozell Dills 3/13, mammo 8/16, colo 12/12 Dr Heber Pedersen  Ophtho - no vision change or eye pain  Cardiac - no CP, PND, orthopnea, edema, palpitations or syncope  Resp - no wheezing, chronic coughing, dyspnea  GI - no heartburn, nausea, vomiting, change in bowel habits, bleeding, hemorrhoids  Urinary - no dysuria, hematuria, flank pain, urgency, frequency  Genitals - no genital lesions, discharge, masses, ulceration, warts  Ortho - no swelling, dec ROM, myalgias  Derm - no nail abnormalities, rashes, lesions of note, hair loss  Psych - denies any anxiety or depression symptoms, no hallucinations or violent ideation  Constitutional - no wt loss, night sweats, unexplained fevers  Endo - no polyuria, polydipsia, nocturia, hot flashes    Visit Vitals  /82   Pulse 82   Temp 99.6 °F (37.6 °C) (Oral)   Resp 14   Ht 5' 4\" (1.626 m)   Wt 214 lb (97.1 kg)   SpO2 98%   BMI 36.73 kg/m²     Affect is appropriate. Mood stable  A&O x3  No apparent distress  Sinuses tender to percussison w fairly boggy mucosa  Op w plaques bilaterally, shotty nontender LN noted submandibular areas  Anicteric, no JVD, adenopathy or thyromegaly.    No carotid bruits or radiated murmur  Lungs clear to auscultation, no wheezes or rales  Heart showed regular rate and rhythm. No murmur, rubs, gallops  Abdomen soft nontender, no hepatosplenomegaly or masses. Extremities without edema.   Pulses 1-2+ symmetrically    LABS  From 5/10 showed   gluc 93,   cr 0.90,              alt 39,                        chol 195, tg 98,   hdl 72, ldl-c 103,  wbc 7.4, hb 12.4, plt 246, tsh 1.23  From 9/11 showed   gluc 95,   cr 0.62, gfr 107, alt 25,           ldl-p 1617,                                                         wbc 6.5, hb 12.3, plt 295, nikia neg, ra neg, esr 11, vit d 44.2  From 11/11 showed gluc 84,   cr 0.63, gfr 106,                    ck 51, navya 4.0  From 4/12 showed   gluc 94,   cr 0.69,      alt 26,           ldl-p 1197, chol 176, tg 59,   hdl 73, ldl-c 91  From 7/12 showed   gluc 78,   cr 0.80, gfr>60,  alt 47  From 5/13 showed                   ldl-p 985,   chol 202, tg 46,  hdl 74, ldl-c 119  From 12/13 showed gluc 102, cr 0.78, gfr 89,   alt 23,                chol 258, tg 95,   hdl 57, ldl-c 182,  wbc 6.3, hb 12.4, plt 311, ua neg  From 4/14 showed         hba1c 6.2,      chol 183, tg 90,   hdl 66, ldl-c 99  From 10/14 showed         hba1c 5.7,      chol 262, tg 79,   hdl 58, ldl-c 188   From 3/15 showed   gluc 91,   cr 0.69, gfr 101, alt 13,       chol 226, tg 78,   hdl 67, ldl-c 143  From 7/15 showed         hba1c 5.9,      chol 265, tg 95,   hdl 68, ldl-c 178  From 9/15 showed   gluc 100, cr 0.62, gfr>60,  alt 12,                   wbc 7.1, hb 12.8, plt 286, tsh 0.78, proBNP 49  From 11/15 showed gluc 92,   cr 0.75, gfr 92,   alt 22, hba1c 6.3,      chol 267, tg 93,   hdl 74, ldl-c 174,         nikia neg, rf neg, esr 3, uric 5.5  From 12/15 showed                     ua neg   From 7/16 showed   gluc 96,   cr 0.66, gfr>60,  alt 40, hba1c 5.8,      chol 319, tg 116, hdl 68, ldl-c 228,           From 1/17 showed   gluc 98,   cr 0.72, gfr>60,  alt 47, hba1c 5.9,      chol 295, tg 121, hdl 69, ldl-c 202,         hep c neg  From 6/17 showed         alt 35, hba1c 6.0,      chol 278, tg 118, hdl 63, ldl-c 191  From 9/17 showed                     b12 520, fol 7.0  From 8/18 showed gluc 101, cr 0.71, gfr>60, alt 58, hba1c 5.9, umar 47    Patient Active Problem List   Diagnosis Code    Myofascial pain syndrome Dr. Dalila Miller M79.18    FER/PLMD Dr. Polina Carolina 2007 now Dr. Cesar Flores AHI 20 on BiPAP G47.33    Depression F32.9    IFG (impaired fasting glucose) R73.01    Essential hypertension I10    Gastroesophageal reflux disease without esophagitis K21.9    Ureteral stone N20.1    Hyperlipidemia E78.5    Severe obesity (BMI 35.0-39. 9) E66.01     ASSESSMENT AND PLAN  1. Obesity. Continue intermittent fasting, tighten up diet and inc exercise  2. Depression. Continue current  3. Hypertension. Continue current regimen. 4.  Cervical disc disease, myofascial pain syndrome. F/U with her specialists, continue current  5. FER/PLMD.  F/U Dr. Cesar Flores  6. Familial hypercholesterolemia, probable. Will retry repatha January 2019  7. IFG. Wt loss, dietary and lifestyle measures. Continue current regimen. RTC 5/19    Above conditions discussed at length and patient vocalized understanding.   All questions answered to patient satisfaction

## 2018-12-11 ENCOUNTER — OFFICE VISIT (OUTPATIENT)
Dept: INTERNAL MEDICINE CLINIC | Age: 53
End: 2018-12-11

## 2018-12-11 VITALS
HEART RATE: 82 BPM | TEMPERATURE: 99.6 F | WEIGHT: 214 LBS | RESPIRATION RATE: 14 BRPM | DIASTOLIC BLOOD PRESSURE: 82 MMHG | BODY MASS INDEX: 36.54 KG/M2 | OXYGEN SATURATION: 98 % | SYSTOLIC BLOOD PRESSURE: 102 MMHG | HEIGHT: 64 IN

## 2018-12-11 DIAGNOSIS — R73.01 IFG (IMPAIRED FASTING GLUCOSE): ICD-10-CM

## 2018-12-11 DIAGNOSIS — E66.01 SEVERE OBESITY WITH BODY MASS INDEX (BMI) OF 35.0 TO 39.9 WITH SERIOUS COMORBIDITY (HCC): ICD-10-CM

## 2018-12-11 DIAGNOSIS — E78.01 FAMILIAL HYPERCHOLESTEROLEMIA: ICD-10-CM

## 2018-12-11 DIAGNOSIS — I10 ESSENTIAL HYPERTENSION: Primary | ICD-10-CM

## 2018-12-11 DIAGNOSIS — F32.A DEPRESSION, UNSPECIFIED DEPRESSION TYPE: ICD-10-CM

## 2018-12-11 DIAGNOSIS — G47.33 OSA (OBSTRUCTIVE SLEEP APNEA): ICD-10-CM

## 2018-12-11 NOTE — PROGRESS NOTES
1. Have you been to the ER, urgent care clinic or hospitalized since your last visit? NO.     2. Have you seen or consulted any other health care providers outside of the [facility] since your last visit (Include any pap smears or colon screening)? NO      Do you have an Advanced Directive? NO    Would you like information on Advanced Directives?  NO    Chief Complaint   Patient presents with    Cholesterol Problem     4 month follow up

## 2019-02-05 ENCOUNTER — OFFICE VISIT (OUTPATIENT)
Dept: INTERNAL MEDICINE CLINIC | Age: 54
End: 2019-02-05

## 2019-02-05 VITALS
TEMPERATURE: 98.9 F | HEIGHT: 64 IN | WEIGHT: 221 LBS | RESPIRATION RATE: 14 BRPM | SYSTOLIC BLOOD PRESSURE: 134 MMHG | OXYGEN SATURATION: 96 % | BODY MASS INDEX: 37.73 KG/M2 | DIASTOLIC BLOOD PRESSURE: 90 MMHG | HEART RATE: 67 BPM

## 2019-02-05 DIAGNOSIS — Z12.31 SCREENING MAMMOGRAM, ENCOUNTER FOR: ICD-10-CM

## 2019-02-05 DIAGNOSIS — B35.9 TINEA: ICD-10-CM

## 2019-02-05 DIAGNOSIS — R73.01 IFG (IMPAIRED FASTING GLUCOSE): ICD-10-CM

## 2019-02-05 DIAGNOSIS — R21 RASH: Primary | ICD-10-CM

## 2019-02-05 RX ORDER — METFORMIN HYDROCHLORIDE 500 MG/1
500 TABLET, EXTENDED RELEASE ORAL
Qty: 60 TAB | Refills: 2 | Status: CANCELLED | OUTPATIENT
Start: 2019-02-05

## 2019-02-05 RX ORDER — ECONAZOLE NITRATE 10 MG/G
CREAM TOPICAL 2 TIMES DAILY
Qty: 15 G | Refills: 0 | Status: SHIPPED | OUTPATIENT
Start: 2019-02-05 | End: 2019-04-17

## 2019-02-05 RX ORDER — METFORMIN HYDROCHLORIDE 500 MG/1
TABLET, EXTENDED RELEASE ORAL
Qty: 180 TAB | Refills: 3 | Status: SHIPPED | OUTPATIENT
Start: 2019-02-05 | End: 2019-04-17 | Stop reason: SDUPTHER

## 2019-02-05 RX ORDER — METFORMIN HYDROCHLORIDE 500 MG/1
TABLET, EXTENDED RELEASE ORAL
Status: CANCELLED | OUTPATIENT
Start: 2019-02-05

## 2019-02-05 NOTE — PROGRESS NOTES
52 y.o. WHITE OR  female who presents for evaluation. For the last 2 weeks, she is noted onset of a diffuse widespread rash which initially started on her torso but is been showing up in her arms and legs also. Macular and mostly pinkish, nontender, not pruritic. They show up and then dry up within a week or so. Nothing new that she is been exposed to, specifically medications, toiletries, chemicals, etc.  She has a second rash that looks more like ringworm to her in the left anterior thigh. Little itchy, not tender. Reports no systemic symptoms whatsoever, otherwise feels fine. Past Medical History:  
Diagnosis Date  Allergic rhinitis s/p desensitization Dr. Andersen Sue  Bursitis of shoulder, left, adhesive  Cervical disc herniation C5-C7 Dr. Yelena Al  Cervical spondylosis  Chondromalacia of right patella  CTS (carpal tunnel syndrome)   
 right  Depression  FHx: heart disease  Gastritis Dr. Abad Paul EGD 2009 w gr 1 esophagitis  GERD (gastroesophageal reflux disease) s/p dilation Dr. Abad Paul 2012  Hyperlipidemia   
 possible famillial hypercholesterolemia by [place] criteria (4)  Hypertension  Kidney stones  Mixed headache 10/11  Morbid obesity ([facility] [address] peak weight 220 lbs, bmi 37.9 from 9/13; qsymia (9/13-3/15) and lost approx 15% wt; belviq no help; no contrave due to narc; IF 8/18 strat weight 222 lbs  Myofascial pain  Osteopenia  Plantar fasciitis   
 left heel  Prediabetes   
 on metformin 11/15  Sleep apnea 2007  
 and PLMD Dr. Shawanda Lafleur now seeing Dr Ksenia Mccray; AHI 20, on APAP for years, BiPAP titration 10/15 optimal pressure 11/7 cm Past Surgical History:  
Procedure Laterality Date  CARDIAC SURG PROCEDURE UNLIST    
 thallium 2004 negative; nuclear stress test neg ef 86% 7/17  HX APPENDECTOMY  HX CHOLECYSTECTOMY  HX COLONOSCOPY Dr. Abad Paul 12/12 negative  HX GI  2006 CT abd/pelvis negative  HX GYN    
 cervical surgery  HX KNEE ARTHROSCOPY  10/13 Right knee  HX ORTHOPAEDIC  2012 R CT release Dr. Marisa Rothman  HX UROLOGICAL    
 s/p urethral dilation  HX UROLOGICAL  12/17/15 [facility]-Cyto/Retro/Ureteroscopy/JJ Cath Social History Socioeconomic History  Marital status:  Spouse name: Not on file  Number of children: 0  
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  
 Drug use: No  
 Sexual activity: Not on file Other Topics Concern  Not on file Social History Narrative  Not on file Current Outpatient Medications Medication Sig  
 evolocumab (REPATHA SURECLICK) pen injection 1 mL by SubCUTAneous route every fourteen (14) days.  metFORMIN ER (GLUCOPHAGE XR) 500 mg tablet TAKE 2 TABLETS BY MOUTH DAILY WITH DINNER  
 econazole nitrate (SPECTAZOLE) 1 % topical cream Apply  to affected area two (2) times a day.  metFORMIN ER (GLUCOPHAGE XR) 500 mg tablet Take 1 Tab by mouth daily (with dinner).  amLODIPine (NORVASC) 5 mg tablet Take 1 Tab by mouth daily.  lisinopril (PRINIVIL, ZESTRIL) 10 mg tablet Take 1 Tab by mouth daily.  omeprazole (PRILOSEC) 40 mg capsule Take 40 mg by mouth daily.  valACYclovir (VALTREX) 1 gram tablet TAKE 1 TABLET BY MOUTH TWICE A DAY (Patient taking differently: as needed. TAKE 1 TABLET BY MOUTH TWICE A DAY)  montelukast (SINGULAIR) 10 mg tablet Take 10 mg by mouth nightly.   
 traMADol (ULTRAM) 50 mg tablet Take 1 Tab by mouth every six (6) hours as needed for Pain. Max Daily Amount: 200 mg.  
 gabapentin (NEURONTIN) 300 mg tablet Take 300 mg by mouth two (2) times a day. Patient takes 900mg in AM and 1200mg in PM  
 DULoxetine (CYMBALTA) 60 mg capsule Take 30 mg by mouth daily.  Armodafinil (NUVIGIL) 250 mg Tab Take 150 mg by mouth daily.  aspirin 81 mg chewable tablet Take 81 mg by mouth daily. No current facility-administered medications for this visit. Allergies Allergen Reactions  Crestor [Rosuvastatin] Myalgia  Hydrochlorothiazide Other (comments) Leg cramps  Lipitor [Atorvastatin] Myalgia  Pravastatin Myalgia  Sulfa (Sulfonamide Antibiotics) Rash and Itching  Verapamil Other (comments) Bad taste, headache  Zocor [Simvastatin] Myalgia Visit Vitals /90 Pulse 67 Temp 98.9 °F (37.2 °C) (Oral) Resp 14 Ht 5' 4\" (1.626 m) Wt 221 lb (100.2 kg) SpO2 96% BMI 37.93 kg/m² A&O x3 Affect is appropriate. Mood stable No apparent distress Anicteric, no JVD, adenopathy or thyromegaly. No carotid bruits or radiated murmur Lungs clear to auscultation, no wheezes or rales Heart showed regular rate and rhythm. No murmur, rubs, gallops Abdomen soft nontender, no hepatosplenomegaly or masses. Extremities without edema. Pulses 1-2+ symmetrically Macular brownish/pinkish lesions scattered in random distribution throughout her torso and some parts of her arms and thighs. Nontender, not flaky, nonpruritic, no associated cellulitic type presentation. She has 2 small lesions that do look like tinea in the left anterior thigh Assessment and plan: 1. Rash etiology unclear. Pityriasis? We will send for dermatology consult. 2.  Probable tinea. I gave her Spectazole cream. 
3.  We briefly discussed her hot flashes, she is on gabapentin and Cymbalta, OTC preparations, not willing to try hormone replacement. 4.  Screening mammogram ordered 5.   Recommended she go see GYN 
 
 
 Above conditions discussed at length and patient vocalized understanding. All questions answered to patient satisfaction

## 2019-02-05 NOTE — PROGRESS NOTES
1. Have you been to the ER, urgent care clinic or hospitalized since your last visit? NO.  
 
2. Have you seen or consulted any other health care providers outside of the [address] [place] [address] since your last visit (Include any pap smears or colon screening)? NO Chief Complaint Patient presents with  Rash  
  rash over chest and back. pt denies any itching or pain x several weeks

## 2019-02-26 ENCOUNTER — TELEPHONE (OUTPATIENT)
Dept: INTERNAL MEDICINE CLINIC | Age: 54
End: 2019-02-26

## 2019-02-26 DIAGNOSIS — E78.01 FAMILIAL HYPERCHOLESTEROLEMIA: Primary | ICD-10-CM

## 2019-02-26 DIAGNOSIS — E78.01 FAMILIAL HYPERCHOLESTEREMIA: ICD-10-CM

## 2019-02-26 NOTE — TELEPHONE ENCOUNTER
Cover my Med called requesting prior authorization for Larisa Ryan medication for question pls. contact # [phone]  Ref# [id number]

## 2019-02-28 NOTE — TELEPHONE ENCOUNTER
[address] to notify patient that Praluent is preferred medication instead of Authur Xavier so Dr. Svaana Ruelas has sent in [facility] to [facility] pharmacy for her.

## 2019-03-13 ENCOUNTER — DOCUMENTATION ONLY (OUTPATIENT)
Dept: INTERNAL MEDICINE CLINIC | Age: 54
End: 2019-03-13

## 2019-03-13 NOTE — PROGRESS NOTES
Cassandra Bran LPN   Licensed Nurse      Progress Notes   Sign at close encounter   Encounter Date:  3/13/2019                        []Hide copied text    []Bonillaver for details      Prior auth for Praluent pen was denied due to coverage is provided in situations where the requested medication is being prescribed by or on constitution with, a cardiologist, endocrinologist, or a physician who focuses on treatment of cardiovascular risk management and or lipid disorders                lmtcb

## 2019-03-13 NOTE — PROGRESS NOTES
pls call pt and tell her the preauth criteria     She will need to see cardiology and they will have to prescribe it for her - just ridiculous    suggest consult dr Tg Noguera

## 2019-03-14 ENCOUNTER — TELEPHONE (OUTPATIENT)
Dept: INTERNAL MEDICINE CLINIC | Age: 54
End: 2019-03-14

## 2019-03-14 NOTE — TELEPHONE ENCOUNTER
LMTCB    Prior auth for Praluent pen was denied due to coverage is provided in situations where the requested medication is being prescribed by or on constitution with, a cardiologist, endocrinologist, or a physician who focuses on treatment of cardiovascular risk management and or lipid disorders       Per Rambo Ac, patient will need to see cardiology and they will have to prescribe it for her     suggest consult dr Lilly Pressley

## 2019-03-14 NOTE — TELEPHONE ENCOUNTER
Pt stated does she have any other options, having to go to a cardiologist is bit of a over kill per patient.

## 2019-03-14 NOTE — TELEPHONE ENCOUNTER
According to the [facility] - endocrine, lipidologist or cardiology will have to prescribe  They do not feel that PCP is qualified to make the call that a pt needs to be on a $1500/mo drug unfortunately

## 2019-03-15 ENCOUNTER — TELEPHONE (OUTPATIENT)
Dept: INTERNAL MEDICINE CLINIC | Age: 54
End: 2019-03-15

## 2019-04-15 NOTE — PROGRESS NOTES
52 y.o. white female who presents for med clearance    She will be undergoing R TKR by Dr Nga Myles on 4/29/19. She had been doing ok with tolerable knee pain until 'it went downhill' and she just is having daily moderately severe pain. She's managing with 1/2 tramadol as higher doses make her sleepy    Reports no cardiovascular complaints. She has not been checking her bp at work or at home. Unable to exercise, just doing adls    She stopped doing the intermittent fasting and weight is up    Vitals 4/17/2019 2/5/2019 12/11/2018 8/7/2018 3/19/2018 2/12/2018   Weight 225 lb 221 lb 214 lb 222 lb 223 lb 221 lb     Denies GI or  issues. Remains overdue to see NP Tanisha Feeling of 4 statins, zetia did not get her to [facility], livalo too expensive.   We appealed the repatha and praluent through the insurance but they are requiring that cardiology prescribe this, she has not scheduled with them as yet    Past Medical History:   Diagnosis Date    Allergic rhinitis     s/p desensitization Dr. Duffy Mortimer of shoulder, left, adhesive     Cervical disc herniation     C5-C7 Dr. Mac Staple Cervical spondylosis     Chondromalacia of right patella     CTS (carpal tunnel syndrome)     right    Depression     FHx: heart disease     Gastritis     Dr. Melba Lim EGD 2009 w gr 1 esophagitis    GERD (gastroesophageal reflux disease)     s/p dilation Dr. Melba Lim 2012    Hyperlipidemia     possible famillial hypercholesterolemia by [place] criteria (4)    Hypertension     Kidney stones     Mixed headache 10/11    Morbid obesity ([facility] [address]     peak weight 220 lbs, bmi 37.9 from 9/13; qsymia (9/13-3/15) and lost approx 15% wt; belviq no help; no contrave due to narc; IF 8/18 strat weight 222 lbs but stopped doing    Myofascial pain     Osteoarthritis     Dr Nga Myles knees    Osteopenia     Plantar fasciitis     left heel    Prediabetes     on metformin 11/15    Sleep apnea 2007    and PLMD Dr. Cox Better now seeing Dr Radha Aleman Kenan; AHI 20, on APAP for years, BiPAP titration 10/15 optimal pressure 11/7 cm      Past Surgical History:   Procedure Laterality Date    CARDIAC SURG PROCEDURE UNLIST      thallium 2004 negative; nuclear stress test neg ef 86% 7/17    HX Jered Ely COLONOSCOPY      Dr. Cass Lubin 12/12 negative    HX GI  2006    CT abd/pelvis negative    HX GYN      cervical surgery    HX KNEE ARTHROSCOPY  10/13    Right knee    HX ORTHOPAEDIC  2012    R CT release Dr. 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 strain: Not on file    Food insecurity:     Worry: Not on file     Inability: Not on file    Transportation needs:     Medical: Not on file     Non-medical: Not on file   Tobacco Use    Smoking status: Former Smoker    Smokeless tobacco: Never Used   Substance and Sexual Activity    Alcohol use: No    Drug use: No    Sexual activity: Not on file   Lifestyle    Physical activity:     Days per week: Not on file     Minutes per session: Not on file    Stress: Not on file   Relationships    Social connections:     Talks on phone: Not on file     Gets together: Not on file     Attends Protestant service: Not on file     Active member of club or organization: Not on file     Attends meetings of clubs or organizations: Not on file     Relationship status: Not on file    Intimate partner violence:     Fear of current or ex partner: Not on file     Emotionally abused: Not on file     Physically abused: Not on file     Forced sexual activity: Not on file   Other Topics Concern    Not on file   Social History Narrative  Not on file     Allergies   Allergen Reactions    Crestor [Rosuvastatin] Myalgia    Hydrochlorothiazide Other (comments)     Leg cramps    Lipitor [Atorvastatin] Myalgia    Pravastatin Myalgia    Sulfa (Sulfonamide Antibiotics) Rash and Itching    Verapamil Other (comments)     Bad taste, headache    Zocor [Simvastatin] Myalgia     Current Outpatient Medications   Medication Sig    naloxegol (MOVANTIK) 25 mg tab tablet Take  by mouth Daily (before breakfast).  metFORMIN ER (GLUCOPHAGE XR) 500 mg tablet Take 1 Tab by mouth daily (with dinner).  amLODIPine (NORVASC) 5 mg tablet Take 1 Tab by mouth daily.  lisinopril (PRINIVIL, ZESTRIL) 10 mg tablet Take 1 Tab by mouth daily.  omeprazole (PRILOSEC) 40 mg capsule Take 40 mg by mouth daily.  valACYclovir (VALTREX) 1 gram tablet TAKE 1 TABLET BY MOUTH TWICE A DAY (Patient taking differently: as needed. TAKE 1 TABLET BY MOUTH TWICE A DAY)    montelukast (SINGULAIR) 10 mg tablet Take 10 mg by mouth nightly.  traMADol (ULTRAM) 50 mg tablet Take 1 Tab by mouth every six (6) hours as needed for Pain. Max Daily Amount: 200 mg.    gabapentin (NEURONTIN) 300 mg tablet Take 300 mg by mouth two (2) times a day. Patient takes 900mg in AM and 1200mg in PM    DULoxetine (CYMBALTA) 60 mg capsule Take 30 mg by mouth daily.  Armodafinil (NUVIGIL) 250 mg Tab Take 150 mg by mouth daily. No current facility-administered medications for this visit.       REVIEW OF SYSTEMS: FNP Alon Manner 3/13, mammo 8/16, colo 12/12 Dr Cass Lubin  Ophtho - no vision change or eye pain  Cardiac - no CP, PND, orthopnea, edema, palpitations or syncope  Resp - no wheezing, chronic coughing, dyspnea  GI - no heartburn, nausea, vomiting, change in bowel habits, bleeding, hemorrhoids  Urinary - no dysuria, hematuria, flank pain, urgency, frequency  Genitals - no genital lesions, discharge, masses, ulceration, warts    Visit Vitals  /86   Pulse 70   Temp 99.1 °F (37.3 °C) (Oral) Resp 14   Ht 5' 4\" (1.626 m)   Wt 225 lb (102.1 kg)   SpO2 96%   BMI 38.62 kg/m²   A&O x3  Affect is appropriate. Mood stable, in some pain from knee  No apparent distress  Anicteric, no JVD, adenopathy or thyromegaly. No carotid bruits or radiated murmur  Lungs clear to auscultation, no wheezes or rales  Heart showed regular rate and rhythm. No murmur, rubs, gallops  Abdomen soft nontender, no hepatosplenomegaly or masses. Extremities without edema.   Pulses 1-2+ symmetrically    LABS  From 5/10 showed   gluc 93,   cr 0.90,              alt 39,                        chol 195, tg 98,   hdl 72, ldl-c 103,  wbc 7.4, hb 12.4, plt 246, tsh 1.23  From 9/11 showed   gluc 95,   cr 0.62, gfr 107, alt 25,           ldl-p 1617,                                                         wbc 6.5, hb 12.3, plt 295, nikia neg, ra neg, esr 11, vit d 44.2  From 11/11 showed gluc 84,   cr 0.63, gfr 106,                    ck 51, navya 4.0  From 4/12 showed   gluc 94,   cr 0.69,      alt 26,           ldl-p 1197, chol 176, tg 59,   hdl 73, ldl-c 91  From 7/12 showed   gluc 78,   cr 0.80, gfr>60,  alt 47  From 5/13 showed                   ldl-p 985,   chol 202, tg 46,  hdl 74, ldl-c 119  From 12/13 showed gluc 102, cr 0.78, gfr 89,   alt 23,                chol 258, tg 95,   hdl 57, ldl-c 182,  wbc 6.3, hb 12.4, plt 311, ua neg  From 4/14 showed         hba1c 6.2,      chol 183, tg 90,   hdl 66, ldl-c 99  From 10/14 showed         hba1c 5.7,      chol 262, tg 79,   hdl 58, ldl-c 188   From 3/15 showed   gluc 91,   cr 0.69, gfr 101, alt 13,       chol 226, tg 78,   hdl 67, ldl-c 143  From 7/15 showed         hba1c 5.9,      chol 265, tg 95,   hdl 68, ldl-c 178  From 9/15 showed   gluc 100, cr 0.62, gfr>60,  alt 12,                   wbc 7.1, hb 12.8, plt 286, tsh 0.78, proBNP 49  From 11/15 showed gluc 92,   cr 0.75, gfr 92,   alt 22, hba1c 6.3,      chol 267, tg 93,   hdl 74, ldl-c 174,         nikia neg, rf neg, esr 3, uric 5.5  From 12/15 showed                     ua neg   From 7/16 showed   gluc 96,   cr 0.66, gfr>60,  alt 40, hba1c 5.8,      chol 319, tg 116, hdl 68, ldl-c 228,           From 1/17 showed   gluc 98,   cr 0.72, gfr>60,  alt 47, hba1c 5.9,      chol 295, tg 121, hdl 69, ldl-c 202,         hep c neg  From 6/17 showed         alt 35, hba1c 6.0,      chol 278, tg 118, hdl 63, ldl-c 191  From 9/17 showed                     b12 520, fol 7.0  From 8/18 showed gluc 101, cr 0.71, gfr>60, alt 58, hba1c 5.9, umar 47    PREOP   From 4/19 showed gluc 98, cr 0.60, gfr>60, alt 43, wbc 5.9, hb 12.9, plt 288, inr 0.97, ua neg, mssa+  EKG as read by me showed nsr rate 81, normal int, no acute changes  CXR showed MESHA    Patient Active Problem List   Diagnosis Code    Myofascial pain syndrome Dr. Alfred Choudhary M79.18    FER/PLMD Dr. Alli Wilde 2007 now Dr. Cathy Perez AHI 20 on BiPAP G47.33    Depression F32.9    IFG (impaired fasting glucose) R73.01    Essential hypertension I10    Gastroesophageal reflux disease without esophagitis K21.9    Hyperlipidemia E78.5    Severe obesity with body mass index (BMI) of 35.0 to 39.9 with serious comorbidity (HCC) E66.01     ASSESSMENT AND PLAN  1. Obesity. Lifestyle and dietary measures. Portion control reiterated. 2.  Depression. Continue current  3. Hypertension. Continue current regimen. 4.  Cervical disc disease, myofascial pain syndrome. F/U with her specialists, continue current although we did discuss inc the cymbalta in the hopes that it'll help the arthritis pain  5. FER/PLMD.  F/U Dr. Cathy Perez  6. Familial hypercholesterolemia, probable. She will schedule w cardiology postop  7. IFG. Wt loss, dietary and lifestyle measures. Continue current regimen. 8.  Ortho. She is felt to be average risk for the planned procedure, no contraindications noted. Routine postop prophylaxis per protocol. RTC 8/19    Above conditions discussed at length and patient vocalized understanding.   All questions answered to patient satisfaction

## 2019-04-17 ENCOUNTER — OFFICE VISIT (OUTPATIENT)
Dept: INTERNAL MEDICINE CLINIC | Age: 54
End: 2019-04-17

## 2019-04-17 VITALS
OXYGEN SATURATION: 96 % | HEART RATE: 70 BPM | TEMPERATURE: 99.1 F | DIASTOLIC BLOOD PRESSURE: 86 MMHG | RESPIRATION RATE: 14 BRPM | SYSTOLIC BLOOD PRESSURE: 137 MMHG | HEIGHT: 64 IN | BODY MASS INDEX: 38.41 KG/M2 | WEIGHT: 225 LBS

## 2019-04-17 DIAGNOSIS — E66.01 SEVERE OBESITY WITH BODY MASS INDEX (BMI) OF 35.0 TO 39.9 WITH SERIOUS COMORBIDITY (HCC): ICD-10-CM

## 2019-04-17 DIAGNOSIS — F32.A DEPRESSION, UNSPECIFIED DEPRESSION TYPE: ICD-10-CM

## 2019-04-17 DIAGNOSIS — R73.01 IFG (IMPAIRED FASTING GLUCOSE): ICD-10-CM

## 2019-04-17 DIAGNOSIS — E78.01 FAMILIAL HYPERCHOLESTEROLEMIA: ICD-10-CM

## 2019-04-17 DIAGNOSIS — I10 ESSENTIAL HYPERTENSION: ICD-10-CM

## 2019-04-17 DIAGNOSIS — K21.9 GASTROESOPHAGEAL REFLUX DISEASE WITHOUT ESOPHAGITIS: ICD-10-CM

## 2019-04-17 DIAGNOSIS — G47.33 OSA (OBSTRUCTIVE SLEEP APNEA): Primary | ICD-10-CM

## 2019-04-17 NOTE — PROGRESS NOTES
Chief Complaint   Patient presents with    Pre-op Exam     right total knee replacement on 4/29/19 with Dr. Luis M Pyle         1. Have you been to the ER, urgent care clinic or hospitalized since your last visit? NO.     2. Have you seen or consulted any other health care providers outside of the [address] [place] [address] since your last visit (Include any pap smears or colon screening)?  NO

## 2019-05-08 RX ORDER — DULOXETIN HYDROCHLORIDE 60 MG/1
30 CAPSULE, DELAYED RELEASE ORAL DAILY
Status: CANCELLED | OUTPATIENT
Start: 2019-05-08

## 2019-05-08 NOTE — TELEPHONE ENCOUNTER
Patient is requesting a refill on her Cymbalta. She states she takes it 2x/day. Please see note below from last OV and outside med history. Please sign if appropriate. Last Visit: 4/17/19 with MD Trini Samuel  Next Appointment: 6/11/19 with MD Trini Samuel    Requested Prescriptions     Pending Prescriptions Disp Refills    DULoxetine (CYMBALTA) 60 mg capsule 60 Cap 1     Sig: Take 1 Cap by mouth two (2) times a day.

## 2019-05-09 NOTE — TELEPHONE ENCOUNTER
Patient stated she is taking the 60mg 2 x day. Patient also stated she recently had a knee replacement she said the surgeons discharge instructions stated to see PCP if they need to see you. She said she is doing fine, no issues but just wanted to clarify if she needed to come in and see you or not.

## 2019-05-10 RX ORDER — DULOXETIN HYDROCHLORIDE 60 MG/1
60 CAPSULE, DELAYED RELEASE ORAL 2 TIMES DAILY
Qty: 60 CAP | Refills: 11 | Status: SHIPPED | OUTPATIENT
Start: 2019-05-10 | End: 2020-09-01

## 2019-05-28 NOTE — TELEPHONE ENCOUNTER
Last Visit: 4/17/19 with MD Shai Singh  Next Appointment: 6/11/19 with MD Shai Singh  Previous Refill Encounter(s): 1/30/18 #20 with 2 refills per MD Cruz    Requested Prescriptions     Pending Prescriptions Disp Refills    valACYclovir (VALTREX) 1 gram tablet 20 Tab 2     Sig: Take 1 Tab by mouth two (2) times a day.

## 2019-05-29 RX ORDER — VALACYCLOVIR HYDROCHLORIDE 1 G/1
1000 TABLET, FILM COATED ORAL 2 TIMES DAILY
Qty: 20 TAB | Refills: 11 | Status: SHIPPED | OUTPATIENT
Start: 2019-05-29 | End: 2020-06-04

## 2019-08-14 DIAGNOSIS — I10 ESSENTIAL HYPERTENSION: ICD-10-CM

## 2019-08-14 RX ORDER — AMLODIPINE BESYLATE 5 MG/1
5 TABLET ORAL DAILY
Qty: 90 TAB | Refills: 3 | Status: SHIPPED | OUTPATIENT
Start: 2019-08-14 | End: 2020-08-10

## 2019-08-14 RX ORDER — LISINOPRIL 10 MG/1
10 TABLET ORAL DAILY
Qty: 90 TAB | Refills: 3 | Status: SHIPPED | OUTPATIENT
Start: 2019-08-14 | End: 2020-08-10

## 2019-08-14 NOTE — TELEPHONE ENCOUNTER
Last Visit: 4/17/19 with MD Tiffany Hargrove  Next Appointment: RTC 8/19  Previous Refill Encounter(s): 8/7/18 #30 with 11 refills    Requested Prescriptions     Pending Prescriptions Disp Refills    lisinopril (PRINIVIL, ZESTRIL) 10 mg tablet 90 Tab 3     Sig: Take 1 Tab by mouth daily.  amLODIPine (NORVASC) 5 mg tablet 90 Tab 3     Sig: Take 1 Tab by mouth daily.

## 2020-02-14 DIAGNOSIS — R73.01 IFG (IMPAIRED FASTING GLUCOSE): ICD-10-CM

## 2020-02-14 RX ORDER — METFORMIN HYDROCHLORIDE 500 MG/1
TABLET, EXTENDED RELEASE ORAL
Qty: 180 TAB | Refills: 3 | Status: SHIPPED | OUTPATIENT
Start: 2020-02-14 | End: 2021-02-10

## 2020-02-17 LAB
HBA1C MFR BLD HPLC: 6 %
HBA1C MFR BLD HPLC: 6 %

## 2020-06-04 RX ORDER — VALACYCLOVIR HYDROCHLORIDE 1 G/1
TABLET, FILM COATED ORAL
Qty: 20 TAB | Refills: 11 | Status: SHIPPED | OUTPATIENT
Start: 2020-06-04 | End: 2020-09-01

## 2020-08-09 DIAGNOSIS — I10 ESSENTIAL HYPERTENSION: ICD-10-CM

## 2020-08-10 RX ORDER — LISINOPRIL 10 MG/1
TABLET ORAL
Qty: 90 TAB | Refills: 3 | Status: SHIPPED | OUTPATIENT
Start: 2020-08-10 | End: 2021-08-02

## 2020-08-10 RX ORDER — AMLODIPINE BESYLATE 5 MG/1
TABLET ORAL
Qty: 90 TAB | Refills: 3 | Status: SHIPPED | OUTPATIENT
Start: 2020-08-10 | End: 2021-08-02

## 2020-08-30 NOTE — PROGRESS NOTES
60 y.o. white female who presents for med clearance    She will be undergoing R TKR revision by Dr Mitchel Pollard. It's been about 14 months since she had the initial TKR by Dr Monika Akers. Sometime in Jan/Feb, somehting happened to the knee and it's been grinding and hurting since then with swelling and dec rom. Reports no cardiovascular complaints. bp controlled. Unable to exercise, just doing adls    Denies GI or  issues outside of stress incontinence which she is not wanting anything for right now. Remains overdue to see her gyn    Intolerant of 4 statins, zetia did not get her to [facility], livalo too expensive.   We appealed the repatha and praluent through the insurance but they are requiring that cardiology prescribe this but she still has not scheduled this    Past Medical History:   Diagnosis Date    Allergic rhinitis     s/p desensitization Dr. Chichi Pack of shoulder, left, adhesive     Cervical disc herniation     C5-C7 Dr. Wilberto Coburn of right patella     Depression     FHx: heart disease     Gastritis     Dr. Edwina Turner EGD 2009 w gr 1 esophagitis    GERD (gastroesophageal reflux disease)     s/p dilation Dr. Edwina Turner 2012    Hyperlipidemia     possible famillial hypercholesterolemia by [place] criteria (4)    Hypertension     IFG (impaired fasting glucose) 12/2013    on metformin 11/15    Mixed headache 10/11    Morbid obesity ([address]     peak weight 220 lbs, bmi 37.9 from 9/13; qsymia (9/13-3/15) and lost approx 15% wt; belviq no help; no contrave due to narc; IF 8/18 strat weight 222 lbs but stopped doing    Myofascial pain     Nephrolithiasis     FER (obstructive sleep apnea) 2007    [facility] Carm[facility]; AHI 20, on APAP for years, BiPAP titration 10/15 optimal pressure 11/7 cm     Osteoarthritis     Dr Monika Akers knees    Osteopenia     Plantar fasciitis     left heel    BRIANNA (stress urinary incontinence, female)      Past Surgical History:   Procedure Laterality Date    CARDIAC SURG PROCEDURE UNLIST      thallium 2004 neg NST neg ef 86% 7/17    HX Isaiah Lesch COLONOSCOPY      Dr. Renny Ivey 12/12 negative    HX GI  2006    CT abd/pelvis negative    HX GYN      cervical surgery    HX KNEE ARTHROSCOPY  10/13    Right knee    HX KNEE REPLACEMENT  04/2019    Dr Napoleon Serrano; RIGHT    HX ORTHOPAEDIC  2012    RIGHT CT release Dr. 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 on file    Food insecurity     Worry: Not on file     Inability: Not on file    Transportation needs     Medical: Not on file     Non-medical: Not on file   Tobacco Use    Smoking status: Former Smoker    Smokeless tobacco: Never Used   Substance and Sexual Activity    Alcohol use: No    Drug use: No    Sexual activity: Not on file   Lifestyle    Physical activity     Days per week: Not on file     Minutes per session: Not on file    Stress: Not on file   Relationships    Social connections     Talks on phone: Not on file     Gets together: Not on file     Attends Alevism service: Not on file     Active member of club or organization: Not on file     Attends meetings of clubs or organizations: Not on file     Relationship status: Not on file    Intimate partner violence     Fear of current or ex partner: Not on file     Emotionally abused: Not on file     Physically abused: Not on file     Forced sexual activity: Not on file   Other Topics Concern    Not on file   Social History Narrative    Not on file     Allergies   Allergen Reactions    Crestor [Rosuvastatin] Myalgia  Hydrochlorothiazide Other (comments)     Leg cramps    Lipitor [Atorvastatin] Myalgia    Pravastatin Myalgia    Sulfa (Sulfonamide Antibiotics) Rash and Itching    Verapamil Other (comments)     Bad taste, headache    Zocor [Simvastatin] Myalgia     Current Outpatient Medications   Medication Sig    metaxalone (SKELAXIN) 800 mg tablet Take 800 mg by mouth every eight (8) hours as needed.  lisinopriL (PRINIVIL, ZESTRIL) 10 mg tablet TAKE 1 TABLET BY MOUTH EVERY DAY    amLODIPine (NORVASC) 5 mg tablet TAKE 1 TABLET BY MOUTH EVERY DAY    metFORMIN ER (GLUCOPHAGE XR) 500 mg tablet TAKE 2 TABLETS BY MOUTH DAILY WITH DINNER    omeprazole (PRILOSEC) 40 mg capsule Take 40 mg by mouth nightly.  montelukast (SINGULAIR) 10 mg tablet Take 10 mg by mouth nightly.  traMADol (ULTRAM) 50 mg tablet Take 1 Tab by mouth every six (6) hours as needed for Pain. Max Daily Amount: 200 mg.    gabapentin (NEURONTIN) 300 mg tablet Take 300 mg by mouth two (2) times a day. Patient takes 900mg in AM and 1200mg in PM    DULoxetine (CYMBALTA) 60 mg capsule Take 60 mg by mouth nightly.  Armodafinil (NuvigiL) 150 mg tab Take 150 mg by mouth daily. No current facility-administered medications for this visit. REVIEW OF SYSTEMS: FNP Tam Ek 3/13, mammo 8/16, colo 12/12 Dr Vikki Tavares  Ophtho - no vision change or eye pain  Cardiac - no CP, PND, orthopnea, edema, palpitations or syncope  Resp - no wheezing, chronic coughing, dyspnea  GI - no heartburn, nausea, vomiting, change in bowel habits, bleeding, hemorrhoids  Urinary - no dysuria, hematuria, flank pain, urgency, frequency  Genitals - no genital lesions, discharge, masses, ulceration, warts    Visit Vitals  /81   Pulse 77   Temp 97.2 °F (36.2 °C) (Temporal)   Resp 14   Ht 5' 3\" (1.6 m)   Wt 222 lb (100.7 kg)   SpO2 95%   BMI 39.33 kg/m²   A&O x3  Affect is appropriate.   Mood stable, in some pain from knee  No apparent distress  Anicteric, no JVD, adenopathy or thyromegaly. No carotid bruits or radiated murmur  Lungs clear to auscultation, no wheezes or rales  Heart showed regular rate and rhythm. No murmur, rubs, gallops  Abdomen soft nontender, no hepatosplenomegaly or masses. Extremities without edema.   Pulses 1-2+ symmetrically    LABS  From 5/10 showed   gluc 93,   cr 0.90,              alt 39,                        chol 195, tg 98,   hdl 72, ldl-c 103,  wbc 7.4, hb 12.4, plt 246, tsh 1.23  From 9/11 showed   gluc 95,   cr 0.62, gfr 107, alt 25,           ldl-p 1617,                                                         wbc 6.5, hb 12.3, plt 295, nikia neg, ra neg, esr 11, vit d 44.2  From 11/11 showed gluc 84,   cr 0.63, gfr 106,                    ck 51, navya 4.0  From 4/12 showed   gluc 94,   cr 0.69,      alt 26,           ldl-p 1197, chol 176, tg 59,   hdl 73, ldl-c 91  From 7/12 showed   gluc 78,   cr 0.80, gfr>60,  alt 47  From 5/13 showed                   ldl-p 985,   chol 202, tg 46,  hdl 74, ldl-c 119  From 12/13 showed gluc 102, cr 0.78, gfr 89,   alt 23,                chol 258, tg 95,   hdl 57, ldl-c 182,  wbc 6.3, hb 12.4, plt 311, ua neg  From 4/14 showed         hba1c 6.2,      chol 183, tg 90,   hdl 66, ldl-c 99  From 10/14 showed         hba1c 5.7,      chol 262, tg 79,   hdl 58, ldl-c 188   From 3/15 showed   gluc 91,   cr 0.69, gfr 101, alt 13,       chol 226, tg 78,   hdl 67, ldl-c 143  From 7/15 showed         hba1c 5.9,      chol 265, tg 95,   hdl 68, ldl-c 178  From 9/15 showed   gluc 100, cr 0.62, gfr>60,  alt 12,                   wbc 7.1, hb 12.8, plt 286, tsh 0.78, proBNP 49  From 11/15 showed gluc 92,   cr 0.75, gfr 92,   alt 22, hba1c 6.3,      chol 267, tg 93,   hdl 74, ldl-c 174,         nikia neg, rf neg, esr 3, uric 5.5  From 12/15 showed                     ua neg   From 7/16 showed   gluc 96,   cr 0.66, gfr>60,  alt 40, hba1c 5.8,      chol 319, tg 116, hdl 68, ldl-c 228,           From 1/17 showed   gluc 98,   cr 0.72, gfr>60, alt 47, hba1c 5.9,      chol 295, tg 121, hdl 69, ldl-c 202,         hep c neg  From 6/17 showed         alt 35, hba1c 6.0,      chol 278, tg 118, hdl 63, ldl-c 191  From 9/17 showed                     b12 520, fol 7.0  From 8/18 showed   glu 101,   cr 0.71, gfr>60,  alt 58, hba1c 5.9, umar 47  From 4/19 showed   gluc 98,   cr 0.60, gfr>60,  alt 43,                   wbc 5.9, hb 12.9, plt 288    PREOP  From 9/20 showed   gluc 97,   cr 0.70, gfr>60,  alt 75, hba1c 5.9, wbc 6.7, hb 12.7, plt 264, inr 1, ptt 33.5, mrsa neg, ua neg  CXR wt MESHA    Patient Active Problem List   Diagnosis Code    Myofascial pain syndrome Dr. Myles Eduardo M79.18    FER/PLMD Dr. Radha Palma 2007 now Dr. Mary Morrell AHI 20 on BiPAP G47.33    Depression F32.9    IFG (impaired fasting glucose) R73.01    Essential hypertension I10    Gastroesophageal reflux disease without esophagitis K21.9    Hyperlipidemia E78.5    Severe obesity with body mass index (BMI) of 35.0 to 39.9 with serious comorbidity (HCC) E66.01    Primary osteoarthritis of knee M17.10     ASSESSMENT AND PLAN  1. Obesity. Lifestyle and dietary measures. Portion control   2. Depression. Continue current  3. Hypertension. Continue current regimen. 4.  Cervical disc disease, myofascial pain syndrome. F/U with her specialists  5. FER/PLMD.  F/U Dr. Mary Morrell  6. Familial hypercholesterolemia, probable. She will schedule w cardiology   7. IFG. Wt loss, dietary and lifestyle measures. Continue current regimen. NEW ISSUES  8. Ortho. She is felt to be average risk for the planned procedure, no contraindications noted. Routine postop prophylaxis per protocol. 9.  Anemia. No bleeding source. Will proceed with labs        RTC 3/21    Above conditions discussed at length and patient vocalized understanding.   All questions answered to patient satisfaction

## 2020-09-03 ENCOUNTER — HOSPITAL ENCOUNTER (OUTPATIENT)
Dept: LAB | Age: 55
Discharge: HOME | End: 2020-09-03
Payer: COMMERCIAL

## 2020-09-03 ENCOUNTER — OFFICE VISIT (OUTPATIENT)
Dept: INTERNAL MEDICINE CLINIC | Age: 55
End: 2020-09-03

## 2020-09-03 VITALS
WEIGHT: 222 LBS | TEMPERATURE: 97.2 F | SYSTOLIC BLOOD PRESSURE: 127 MMHG | HEART RATE: 77 BPM | RESPIRATION RATE: 14 BRPM | BODY MASS INDEX: 39.34 KG/M2 | OXYGEN SATURATION: 95 % | DIASTOLIC BLOOD PRESSURE: 81 MMHG | HEIGHT: 63 IN

## 2020-09-03 DIAGNOSIS — M17.10 PRIMARY OSTEOARTHRITIS OF KNEE, UNSPECIFIED LATERALITY: ICD-10-CM

## 2020-09-03 DIAGNOSIS — R73.01 IFG (IMPAIRED FASTING GLUCOSE): ICD-10-CM

## 2020-09-03 DIAGNOSIS — D64.9 ANEMIA, UNSPECIFIED TYPE: ICD-10-CM

## 2020-09-03 DIAGNOSIS — K21.9 GASTROESOPHAGEAL REFLUX DISEASE WITHOUT ESOPHAGITIS: ICD-10-CM

## 2020-09-03 DIAGNOSIS — Z01.818 PREOP EXAM FOR INTERNAL MEDICINE: Primary | ICD-10-CM

## 2020-09-03 DIAGNOSIS — E53.8 B12 DEFICIENCY: ICD-10-CM

## 2020-09-03 DIAGNOSIS — E78.01 FAMILIAL HYPERCHOLESTEROLEMIA: ICD-10-CM

## 2020-09-03 DIAGNOSIS — G47.33 OSA (OBSTRUCTIVE SLEEP APNEA): ICD-10-CM

## 2020-09-03 DIAGNOSIS — I10 ESSENTIAL HYPERTENSION: ICD-10-CM

## 2020-09-03 LAB
FERRITIN SERPL-MCNC: 63 NG/ML (ref 8–388)
FOLATE SERPL-MCNC: 14 NG/ML (ref 3.1–17.5)
IRON SATN MFR SERPL: 16 % (ref 20–50)
IRON SERPL-MCNC: 65 UG/DL (ref 50–175)
RETICS/RBC NFR AUTO: 1.1 % (ref 0.5–2.3)
TIBC SERPL-MCNC: 406 UG/DL (ref 250–450)
VIT B12 SERPL-MCNC: 616 PG/ML (ref 211–911)

## 2020-09-03 PROCEDURE — 84155 ASSAY OF PROTEIN SERUM: CPT

## 2020-09-03 PROCEDURE — 82728 ASSAY OF FERRITIN: CPT

## 2020-09-03 PROCEDURE — 36415 COLL VENOUS BLD VENIPUNCTURE: CPT

## 2020-09-03 PROCEDURE — 82607 VITAMIN B-12: CPT

## 2020-09-03 PROCEDURE — 83921 ORGANIC ACID SINGLE QUANT: CPT

## 2020-09-03 PROCEDURE — 85045 AUTOMATED RETICULOCYTE COUNT: CPT

## 2020-09-03 PROCEDURE — 83540 ASSAY OF IRON: CPT

## 2020-09-04 ENCOUNTER — DOCUMENTATION ONLY (OUTPATIENT)
Dept: INTERNAL MEDICINE CLINIC | Age: 55
End: 2020-09-04

## 2020-09-04 LAB
ALBUMIN SERPL ELPH-MCNC: 3.7 G/DL (ref 2.9–4.4)
ALBUMIN/GLOB SERPL: 1.2 {RATIO} (ref 0.7–1.7)
ALPHA1 GLOB SERPL ELPH-MCNC: 0.2 G/DL (ref 0–0.4)
ALPHA2 GLOB SERPL ELPH-MCNC: 0.7 G/DL (ref 0.4–1)
B-GLOBULIN SERPL ELPH-MCNC: 1.2 G/DL (ref 0.7–1.3)
GAMMA GLOB SERPL ELPH-MCNC: 0.9 G/DL (ref 0.4–1.8)
GLOBULIN SER CALC-MCNC: 3.1 G/DL (ref 2.2–3.9)
M PROTEIN SERPL ELPH-MCNC: NORMAL G/DL
PROT SERPL-MCNC: 6.8 G/DL (ref 6–8.5)

## 2020-09-09 ENCOUNTER — TELEPHONE (OUTPATIENT)
Dept: INTERNAL MEDICINE CLINIC | Age: 55
End: 2020-09-09

## 2020-09-09 LAB
Lab: NORMAL
METHYLMALONATE SERPL-SCNC: 143 NMOL/L (ref 0–378)

## 2020-09-09 NOTE — TELEPHONE ENCOUNTER
pls call    Results for orders placed or performed during the hospital encounter of 09/03/20   METHYLMALONIC ACID   Result Value Ref Range    Methylmalonic acid PENDING nmol/L    Disclaimer Comment     FERRITIN   Result Value Ref Range    Ferritin 63 8 - 388 NG/ML   RETICULOCYTE COUNT   Result Value Ref Range    Reticulocyte count 1.1 0.5 - 2.3 %   PROTEIN ELECTROPHORESIS   Result Value Ref Range    Protein, total 6.8 6.0 - 8.5 g/dL    Albumin 3.7 2.9 - 4.4 g/dL    Alpha-1-globulin 0.2 0.0 - 0.4 g/dL    ALPHA-2 GLOBULIN 0.7 0.4 - 1.0 g/dL    Beta globulin 1.2 0.7 - 1.3 g/dL    Gamma globulin 0.9 0.4 - 1.8 g/dL    M-[place] Not Observed Not Observed g/dL    Globulin, total 3.1 2.2 - 3.9 g/dL    A/G ratio 1.2 0.7 - 1.7     VITAMIN B12 & FOLATE   Result Value Ref Range    Vitamin B12 616 211 - 911 pg/mL    Folate 14.0 3.10 - 17.50 ng/mL   IRON PROFILE   Result Value Ref Range    Iron 65 50 - 175 ug/dL    TIBC 406 250 - 450 ug/dL    Iron % saturation 16 (L) 20 - 50 %     Low iron  Suggest GI consult - sched wit her preferred GI doc (she works for [employer]) is agrees

## 2020-09-10 ENCOUNTER — TELEPHONE (OUTPATIENT)
Dept: INTERNAL MEDICINE CLINIC | Age: 55
End: 2020-09-10

## 2020-09-10 NOTE — TELEPHONE ENCOUNTER
Pt aware of message below and verbalized understanding. No further questions or concerns from pt at this time.      Labs faxed to

## 2020-09-15 ENCOUNTER — TELEPHONE (OUTPATIENT)
Dept: INTERNAL MEDICINE CLINIC | Age: 55
End: 2020-09-15

## 2020-09-15 NOTE — TELEPHONE ENCOUNTER
Patient is requesting most recent labs be faxed to Dr. Piotr Ibrahim at [place] [address]    Fax: [phone]

## 2020-09-23 PROBLEM — M17.11 OSTEOARTHRITIS OF RIGHT KNEE: Status: ACTIVE | Noted: 2020-09-23

## 2020-11-06 ENCOUNTER — TRANSCRIBE ORDER (OUTPATIENT)
Dept: SCHEDULING | Age: 55
End: 2020-11-06

## 2020-11-06 DIAGNOSIS — Z12.31 VISIT FOR SCREENING MAMMOGRAM: Primary | ICD-10-CM

## 2021-01-14 ENCOUNTER — HOSPITAL ENCOUNTER (OUTPATIENT)
Dept: MAMMOGRAPHY | Age: 56
Discharge: HOME | End: 2021-01-14
Attending: INTERNAL MEDICINE
Payer: COMMERCIAL

## 2021-01-14 DIAGNOSIS — Z12.31 VISIT FOR SCREENING MAMMOGRAM: ICD-10-CM

## 2021-01-14 PROCEDURE — 77063 BREAST TOMOSYNTHESIS BI: CPT

## 2021-02-06 DIAGNOSIS — R73.01 IFG (IMPAIRED FASTING GLUCOSE): ICD-10-CM

## 2021-02-10 RX ORDER — METFORMIN HYDROCHLORIDE 500 MG/1
TABLET, EXTENDED RELEASE ORAL
Qty: 180 TAB | Refills: 3 | Status: SHIPPED | OUTPATIENT
Start: 2021-02-10 | End: 2022-02-02

## 2021-08-02 DIAGNOSIS — I10 ESSENTIAL HYPERTENSION: ICD-10-CM

## 2021-08-02 RX ORDER — AMLODIPINE BESYLATE 5 MG/1
TABLET ORAL
Qty: 90 TABLET | Refills: 0 | Status: SHIPPED | OUTPATIENT
Start: 2021-08-02 | End: 2021-11-15

## 2021-08-02 RX ORDER — LISINOPRIL 10 MG/1
TABLET ORAL
Qty: 90 TABLET | Refills: 0 | Status: SHIPPED | OUTPATIENT
Start: 2021-08-02 | End: 2021-11-15

## 2021-08-30 ENCOUNTER — TELEPHONE (OUTPATIENT)
Dept: INTERNAL MEDICINE CLINIC | Age: 56
End: 2021-08-30

## 2021-08-30 DIAGNOSIS — E78.5 HYPERLIPIDEMIA, UNSPECIFIED HYPERLIPIDEMIA TYPE: ICD-10-CM

## 2021-08-30 DIAGNOSIS — R73.01 IFG (IMPAIRED FASTING GLUCOSE): ICD-10-CM

## 2021-08-30 DIAGNOSIS — I10 ESSENTIAL HYPERTENSION: Primary | ICD-10-CM

## 2021-09-11 ENCOUNTER — HOSPITAL ENCOUNTER (OUTPATIENT)
Dept: LAB | Age: 56
Discharge: HOME | End: 2021-09-11
Payer: COMMERCIAL

## 2021-09-11 LAB
ALBUMIN SERPL-MCNC: 3.8 G/DL (ref 3.4–5)
ALBUMIN/GLOB SERPL: 1.1 {RATIO} (ref 0.8–1.7)
ALP SERPL-CCNC: 92 U/L (ref 45–117)
ALT SERPL-CCNC: 70 U/L (ref 13–56)
ANION GAP SERPL CALC-SCNC: 4 MMOL/L (ref 3–18)
AST SERPL-CCNC: 34 U/L (ref 10–38)
BILIRUB SERPL-MCNC: 0.3 MG/DL (ref 0.2–1)
BUN SERPL-MCNC: 18 MG/DL (ref 7–18)
BUN/CREAT SERPL: 28 (ref 12–20)
CALCIUM SERPL-MCNC: 8.9 MG/DL (ref 8.5–10.1)
CHLORIDE SERPL-SCNC: 108 MMOL/L (ref 100–111)
CHOLEST SERPL-MCNC: 217 MG/DL
CO2 SERPL-SCNC: 27 MMOL/L (ref 21–32)
CREAT SERPL-MCNC: 0.64 MG/DL (ref 0.6–1.3)
CREAT UR-MCNC: 67 MG/DL (ref 30–125)
GLOBULIN SER CALC-MCNC: 3.6 G/DL (ref 2–4)
GLUCOSE SERPL-MCNC: 102 MG/DL (ref 74–99)
HBA1C MFR BLD: 5.9 % (ref 4.2–5.6)
HDLC SERPL-MCNC: 68 MG/DL (ref 40–60)
HDLC SERPL: 3.2 {RATIO} (ref 0–5)
LDLC SERPL CALC-MCNC: 131.4 MG/DL (ref 0–100)
LIPID PROFILE,FLP: ABNORMAL
MICROALBUMIN UR-MCNC: 0.72 MG/DL (ref 0–3)
MICROALBUMIN/CREAT UR-RTO: 11 MG/G (ref 0–30)
POTASSIUM SERPL-SCNC: 4.5 MMOL/L (ref 3.5–5.5)
PROT SERPL-MCNC: 7.4 G/DL (ref 6.4–8.2)
SODIUM SERPL-SCNC: 139 MMOL/L (ref 136–145)
TRIGL SERPL-MCNC: 88 MG/DL (ref ?–150)
VLDLC SERPL CALC-MCNC: 17.6 MG/DL

## 2021-09-11 PROCEDURE — 80053 COMPREHEN METABOLIC PANEL: CPT

## 2021-09-11 PROCEDURE — 83036 HEMOGLOBIN GLYCOSYLATED A1C: CPT

## 2021-09-11 PROCEDURE — 82043 UR ALBUMIN QUANTITATIVE: CPT

## 2021-09-11 PROCEDURE — 80061 LIPID PANEL: CPT

## 2021-09-11 PROCEDURE — 36415 COLL VENOUS BLD VENIPUNCTURE: CPT

## 2021-09-15 NOTE — PROGRESS NOTES
60 y.o. white female who presents for RPE    She underwent R TKR revision by Dr Elizabeth Conteh about a year ago and had great outcomes until about 5 mos ago or so when she started having pain and swelling again. They are pursuing w/u and etiology unclear, she is using the cymbalta, alexandro, tylenol for baseline and tramadol for severe pain. Unable to exercise, she can do her work and adls but not much more    Reports no cardiovascular complaints. bp controlled.       Denies GI or  issues outside of stress incontinence     She saw Dr Farmer  per insurance requirements, got nl echo and neg NST, he was finally able to get her the repatha which she has taken about a month now and no prob so far     Past Medical History:   Diagnosis Date    Allergic rhinitis     s/p desensitization Dr. Kera Ferrell Bursitis of shoulder, left, adhesive     Degenerative arthritis of cervical spine     C5-C7 Dr. Nela Rudolph Depression     FHx: heart disease     Gastritis     Dr. Ping Poe EGD 2009 w gr 1 esophagitis    GERD (gastroesophageal reflux disease)     s/p dilation Dr. Ping Poe 2012; esophagitis s/p dilation 3/21 Dr Marylee Providence H/O cardiovascular stress test 07/2021    Dr Farmer ; NST low risk, ef 80%, no wma    H/O echocardiogram 07/2021    Dr Farmer ; nl lv, ef 62%, no valve prob    Hyperlipidemia     possible famillial hypercholesterolemia by [place] criteria (4)    Hypertension     IFG (impaired fasting glucose) 12/2013    on metformin 11/15    Mixed headache 10/11    Morbid obesity ([facility] [address]     peak weight 220 lbs, bmi 37.9 from 9/13; qsymia (9/13-3/15) and lost approx 15% wt; belviq no help; no contrave due to narc; IF 8/18 strat weight 222 lbs but stopped doing    Myofascial pain     Nephrolithiasis     FER (obstructive sleep apnea) 2007    and PLMD Dr. Olson Cassette now seeing Dr Viktoriya Lou; AHI 20, on APAP for years, BiPAP titration 10/15 optimal pressure 11/7 cm     Osteoarthritis, knee     Dr Gini Le R TKR, redo Dr Hipolito Parnell Plantar fasciitis     LEFT    BRIANNA (stress urinary incontinence, female)      Past Surgical History:   Procedure Laterality Date    HX APPENDECTOMY      HX [address] Right 2012    Dr. Martha Aguirre Drivers 12/12 negative    HX GI  2006    CT abd/pelvis negative    HX GYN      cervical surgery    HX KNEE ARTHROSCOPY  10/13    Right knee    HX KNEE REPLACEMENT Right     Dr Rodger Leon 4/19; Dr Varghese Citizen 9/20    HX UROLOGICAL      s/p urethral dilation    HX UROLOGICAL  12/17/15    [facility]-Cyto/Retro/Ureteroscopy/JJ Cath    NV CARDIAC SURG PROCEDURE UNLIST      thallium 2004 neg NST neg ef 86% 7/17     Social History     Socioeconomic History    Marital status:      Spouse name: Not on file    Number of children: 0    Years of education: Not on file    Highest education level: Not on file   Occupational History    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   Tobacco Use    Smoking status: Former Smoker    Smokeless tobacco: Never Used   Substance and Sexual Activity    Alcohol use: No    Drug use: No    Sexual activity: Not on file   Other Topics Concern    Not on file   Social History Narrative    Not on file     Social Determinants of Health     Financial Resource Strain:     Difficulty of Paying Living Expenses:    Food Insecurity:     Worried About [address] in the Last Year:     [address] [facility] [employer] in the Last Year:    Transportation Needs:     Lack of Transportation (Medical):      Lack of Transportation (Non-Medical):    Physical Activity:     Days of Exercise per Week:     Minutes of Exercise per Session:    Stress:     Feeling of Stress :    Social Connections:     Frequency of Communication with Friends and Family:     Frequency of Social Gatherings with Friends and Family:     Attends Yazidi Services:     Active Member of Clubs or Organizations:     Attends Club or Organization Meetings:     Marital Status:    Intimate Partner Violence:     Fear of Current or Ex-Partner:     Emotionally Abused:     Physically Abused:     Sexually Abused: Allergies   Allergen Reactions    Crestor [Rosuvastatin] Myalgia    Hydrochlorothiazide Other (comments)     Leg cramps    Lipitor [Atorvastatin] Myalgia    Pravastatin Myalgia    Sulfa (Sulfonamide Antibiotics) Rash and Itching    Verapamil Other (comments)     Bad taste, headache    Zocor [Simvastatin] Myalgia     Current Outpatient Medications   Medication Sig    traMADoL (ULTRAM) 50 mg tablet Take 50 mg by mouth as needed.  Repatha SureClick pen injection     aspirin delayed-release 81 mg tablet Take 81 mg by mouth daily.  lisinopriL (PRINIVIL, ZESTRIL) 10 mg tablet TAKE 1 TABLET BY MOUTH EVERY DAY    amLODIPine (NORVASC) 5 mg tablet TAKE 1 TABLET BY MOUTH EVERY DAY    metFORMIN ER (GLUCOPHAGE XR) 500 mg tablet TAKE 2 TABLETS BY MOUTH DAILY WITH DINNER    acetaminophen (TYLENOL) 500 mg tablet Take 2 Tabs by mouth every six (6) hours.  metaxalone (SKELAXIN) 800 mg tablet Take 800 mg by mouth every eight (8) hours as needed.  omeprazole (PRILOSEC) 40 mg capsule Take 40 mg by mouth nightly.  montelukast (SINGULAIR) 10 mg tablet Take 10 mg by mouth nightly.  gabapentin (NEURONTIN) 300 mg tablet Take 300 mg by mouth two (2) times a day. Patient takes 900mg in AM and 1200mg in PM    DULoxetine (CYMBALTA) 60 mg capsule Take 60 mg by mouth nightly.  Armodafinil (NuvigiL) 150 mg tab Take 150 mg by mouth daily. No current facility-administered medications for this visit.      REVIEW OF SYSTEMS: CLOVIS Spivey 1/21, colo 12/12 Dr Howard Sender  Ophtho - no vision change or eye pain  Cardiac - no CP, PND, orthopnea, edema, palpitations or syncope  Resp - no wheezing, chronic coughing, dyspnea  GI - no heartburn, nausea, vomiting, change in bowel habits, bleeding, hemorrhoids  Urinary - no dysuria, hematuria, flank pain, urgency, frequency  Genitals - no genital lesions, discharge, masses, ulceration, warts    Visit Vitals  /82   Pulse 79   Temp 97.2 °F (36.2 °C) (Temporal)   Resp 16   Ht 5' 3\" (1.6 m)   Wt 221 lb (100.2 kg)   SpO2 96%   BMI 39.15 kg/m²   A&O x3  Affect is appropriate. Mood stable, in some pain from knee  No apparent distress  Anicteric, no JVD, adenopathy or thyromegaly. No carotid bruits or radiated murmur  Lungs clear to auscultation, no wheezes or rales  Heart showed regular rate and rhythm. No murmur, rubs, gallops  Abdomen soft nontender, no hepatosplenomegaly or masses. Extremities without edema.   Pulses 1-2+ symmetrically    LABS  From 5/10 showed   gluc 93,   cr 0.90,              alt 39,                        chol 195, tg 98,   hdl 72, ldl-c 103,  wbc 7.4, hb 12.4, plt 246, tsh 1.23  From 9/11 showed   gluc 95,   cr 0.62, gfr 107, alt 25,           ldl-p 1617,                                                         wbc 6.5, hb 12.3, plt 295, nikia neg, ra neg, esr 11, vit d 44.2  From 11/11 showed gluc 84,   cr 0.63, gfr 106,                    ck 51, navya 4.0  From 4/12 showed   gluc 94,   cr 0.69,      alt 26,           ldl-p 1197, chol 176, tg 59,   hdl 73, ldl-c 91  From 7/12 showed   gluc 78,   cr 0.80, gfr>60,  alt 47  From 5/13 showed                   ldl-p 985,   chol 202, tg 46,  hdl 74, ldl-c 119  From 12/13 showed gluc 102, cr 0.78, gfr 89,   alt 23,                chol 258, tg 95,   hdl 57, ldl-c 182,  wbc 6.3, hb 12.4, plt 311, ua neg  From 4/14 showed         hba1c 6.2,      chol 183, tg 90,   hdl 66, ldl-c 99  From 10/14 showed         hba1c 5.7,      chol 262, tg 79,   hdl 58, ldl-c 188   From 3/15 showed   gluc 91,   cr 0.69, gfr 101, alt 13,       chol 226, tg 78,   hdl 67, ldl-c 143  From 7/15 showed         hba1c 5.9,      chol 265, tg 95,   hdl 68, ldl-c 178  From 9/15 showed   gluc 100, cr 0.62, gfr>60,  alt 12,                   wbc 7.1, hb 12.8, plt 286, tsh 0.78, proBNP 49  From 11/15 showed gluc 92,   cr 0.75, gfr 92,   alt 22, hba1c 6.3,      chol 267, tg 93,   hdl 74, ldl-c 174,         nikia neg, rf neg, esr 3, uric 5.5  From 12/15 showed                     ua neg   From 7/16 showed   gluc 96,   cr 0.66, gfr>60,  alt 40, hba1c 5.8,      chol 319, tg 116, hdl 68, ldl-c 228,           From 1/17 showed   gluc 98,   cr 0.72, gfr>60,  alt 47, hba1c 5.9,      chol 295, tg 121, hdl 69, ldl-c 202,         hep c-  From 6/17 showed         alt 35, hba1c 6.0,      chol 278, tg 118, hdl 63, ldl-c 191  From 9/17 showed                                   b12 520, fol 7  From 8/18 showed   glu 101,   cr 0.71, gfr>60,  alt 58, hba1c 5.9, umar 47  From 4/19 showed   gluc 98,   cr 0.60, gfr>60,  alt 43,                   wbc 5.9, hb 12.9, plt 288  From 9/20 showed   gluc 97,   cr 0.70, gfr>60,  alt 75, hba1c 5.9,                  wbc 6.7, hb 12.7, plt 264, fe 65, %sat 16, ferritin 63, b12 616, fol 14, spep neg, retic 1.1, mma 143    Results for orders placed or performed during the hospital encounter of 09/11/21   MICROALBUMIN, UR, RAND W/ MICROALB/CREAT RATIO   Result Value Ref Range    Microalbumin,urine random 0.72 0 - 3.0 MG/DL    Creatinine, urine 67.00 30 - 125 mg/dL    Microalbumin/Creat ratio (mg/g creat) 11 0 - 30 mg/g   HEMOGLOBIN A1C W/O EAG   Result Value Ref Range    Hemoglobin A1c 5.9 (H) 4.2 - 5.6 %   LIPID PANEL   Result Value Ref Range    LIPID PROFILE          Cholesterol, total 217 (H) <200 MG/DL    Triglyceride 88 <150 MG/DL    HDL Cholesterol 68 (H) 40 - 60 MG/DL    LDL, calculated 131.4 (H) 0 - 100 MG/DL    VLDL, calculated 17.6 MG/DL    CHOL/HDL Ratio 3.2 0 - 5.0     METABOLIC PANEL, COMPREHENSIVE   Result Value Ref Range    Sodium 139 136 - 145 mmol/L    Potassium 4.5 3.5 - 5.5 mmol/L    Chloride 108 100 - 111 mmol/L    CO2 27 21 - 32 mmol/L    Anion gap 4 3.0 - 18 mmol/L    Glucose 102 (H) 74 - 99 mg/dL    BUN 18 7.0 - 18 MG/DL    Creatinine 0.64 0.6 - 1.3 MG/DL    BUN/Creatinine ratio 28 (H) 12 - 20      GFR est AA >60 >60 ml/min/1.73m2    GFR est non-AA >60 >60 ml/min/1.73m2    Calcium 8.9 8.5 - 10.1 MG/DL    Bilirubin, total 0.3 0.2 - 1.0 MG/DL    ALT (SGPT) 70 (H) 13 - 56 U/L    AST (SGOT) 34 10 - 38 U/L    Alk. phosphatase 92 45 - 117 U/L    Protein, total 7.4 6.4 - 8.2 g/dL    Albumin 3.8 3.4 - 5.0 g/dL    Globulin 3.6 2.0 - 4.0 g/dL    A-G Ratio 1.1 0.8 - 1.7       We reviewed the patient's labs from the last several visits to point out trends in the numbers            Patient Active Problem List   Diagnosis Code    Myofascial pain syndrome Dr. Neftaly Germain M79.18    FER/PLMD Dr. Lisha oS 2007 now Dr. Marcell Richardson AHI 20 on BiPAP G47.33    Depression F32.9    IFG (impaired fasting glucose) R73.01    Essential hypertension I10    Gastroesophageal reflux disease without esophagitis K21.9    Hyperlipidemia E78.5    Severe obesity with body mass index (BMI) of 35.0 to 39.9 with serious comorbidity (HCC) E66.01    Osteoarthritis of right knee M17.11     ASSESSMENT AND PLAN  1. Obesity. Lifestyle and dietary measures. Portion control   2. Depression. Continue current  3. Hypertension. Continue current regimen. 4.  Cervical disc disease, myofascial pain syndrome. F/U with her specialists  5. FER/PLMD.  F/U Dr. Marcell Richardson  6. Familial hypercholesterolemia. Doing ok on repatha, follow up Dr Zenaida Ornelas  7. IFG. Wt loss, dietary and lifestyle measures. Continue current regimen. 8.  Elevated ALT. Likely fatty liver; declined full evaluation/imaging due to cost issues, check serologies next draw        RTC 9/22    Above conditions discussed at length and patient vocalized understanding. All questions answered to patient satisfaction        ICD-10-CM ICD-9-CM    1. Physical exam  Z00.00 V70.9 CBC W/O DIFF      METABOLIC PANEL, COMPREHENSIVE      HEP B SURFACE AG      LIPID PANEL      HEMOGLOBIN A1C WITH EAG      CERULOPLASMIN      MITOCHONDRIAL M2 AB   2. Essential hypertension  I10 401.9    3.  IFG (impaired fasting glucose) R73.01 790.21 HEMOGLOBIN A1C WITH EAG   4. FER/PLMD Dr. Anita Barney 2007 now Dr. Daniel Tse AHI 20 on BiPAP  G47.33 327.23    5. Familial hypercholesterolemia  E78.01 272.0 LIPID PANEL   6. Myofascial pain syndrome Dr. Kathy Ren  M79.18 729.1    7. Severe obesity with body mass index (BMI) of 35.0 to 39.9 with serious comorbidity (HCC)  E66.01 278.01    8.  Transaminasemia  R74.01 790.4 HEP B SURFACE AG      CERULOPLASMIN      MITOCHONDRIAL M2 AB

## 2021-09-21 ENCOUNTER — OFFICE VISIT (OUTPATIENT)
Dept: INTERNAL MEDICINE CLINIC | Age: 56
End: 2021-09-21
Payer: COMMERCIAL

## 2021-09-21 VITALS
TEMPERATURE: 97.2 F | HEART RATE: 79 BPM | HEIGHT: 63 IN | WEIGHT: 221 LBS | OXYGEN SATURATION: 96 % | DIASTOLIC BLOOD PRESSURE: 82 MMHG | SYSTOLIC BLOOD PRESSURE: 125 MMHG | BODY MASS INDEX: 39.16 KG/M2 | RESPIRATION RATE: 16 BRPM

## 2021-09-21 DIAGNOSIS — I10 ESSENTIAL HYPERTENSION: ICD-10-CM

## 2021-09-21 DIAGNOSIS — Z00.00 PHYSICAL EXAM: Primary | ICD-10-CM

## 2021-09-21 DIAGNOSIS — R74.01 TRANSAMINASEMIA: ICD-10-CM

## 2021-09-21 DIAGNOSIS — G47.33 OSA (OBSTRUCTIVE SLEEP APNEA): ICD-10-CM

## 2021-09-21 DIAGNOSIS — E66.01 SEVERE OBESITY WITH BODY MASS INDEX (BMI) OF 35.0 TO 39.9 WITH SERIOUS COMORBIDITY (HCC): ICD-10-CM

## 2021-09-21 DIAGNOSIS — M79.18 MYOFASCIAL PAIN: ICD-10-CM

## 2021-09-21 DIAGNOSIS — E78.01 FAMILIAL HYPERCHOLESTEROLEMIA: ICD-10-CM

## 2021-09-21 DIAGNOSIS — R73.01 IFG (IMPAIRED FASTING GLUCOSE): ICD-10-CM

## 2021-09-21 PROBLEM — M17.10 PRIMARY OSTEOARTHRITIS OF KNEE: Status: RESOLVED | Noted: 2020-09-03 | Resolved: 2021-09-21

## 2021-09-21 PROCEDURE — 99396 PREV VISIT EST AGE 40-64: CPT | Performed by: INTERNAL MEDICINE

## 2021-09-21 RX ORDER — TRAMADOL HYDROCHLORIDE 50 MG/1
50 TABLET ORAL AS NEEDED
COMMUNITY

## 2021-09-21 RX ORDER — ASPIRIN 81 MG/1
81 TABLET ORAL DAILY
COMMUNITY

## 2021-09-21 RX ORDER — EVOLOCUMAB 140 MG/ML
INJECTION, SOLUTION SUBCUTANEOUS
COMMUNITY
Start: 2021-08-26

## 2021-09-21 NOTE — PROGRESS NOTES
Juan Coultre presents today for   Chief Complaint   Patient presents with    Follow-up    Cholesterol Problem    Hypertension    Labs     9-11-21       Coordination of Care:  1. Have you been to the ER, urgent care clinic since your last visit? Hospitalized since your last visit? YES    2. Have you seen or consulted any other health care providers outside of the [address] [place] [address] since your last visit? Include any pap smears or colon screening.  YES

## 2021-12-14 RX ORDER — VALACYCLOVIR HYDROCHLORIDE 1 G/1
TABLET, FILM COATED ORAL
Qty: 20 TABLET | Refills: 11 | Status: SHIPPED | OUTPATIENT
Start: 2021-12-14

## 2022-02-02 DIAGNOSIS — R73.01 IFG (IMPAIRED FASTING GLUCOSE): ICD-10-CM

## 2022-02-02 RX ORDER — METFORMIN HYDROCHLORIDE 500 MG/1
TABLET, EXTENDED RELEASE ORAL
Qty: 180 TABLET | Refills: 3 | Status: SHIPPED | OUTPATIENT
Start: 2022-02-02

## 2022-03-19 PROBLEM — E66.01 SEVERE OBESITY WITH BODY MASS INDEX (BMI) OF 35.0 TO 39.9 WITH SERIOUS COMORBIDITY (HCC): Status: ACTIVE | Noted: 2018-08-07

## 2022-03-19 PROBLEM — M17.11 OSTEOARTHRITIS OF RIGHT KNEE: Status: ACTIVE | Noted: 2020-09-23

## 2022-03-19 PROBLEM — E78.5 HYPERLIPIDEMIA: Status: ACTIVE | Noted: 2017-06-21

## 2022-04-24 NOTE — PROGRESS NOTES
60 y.o. white female who presents for preop evaluation    She will be undergoing bilat cataract surgery by Dr Pamela Teresa in the upcoming weeks    She underwent R TKR revision by Dr Kelley James and apparently, they will have to do another revision as her 'ligaments are stretching out'. Making do with a brace until then    Reports no cardiovascular complaints.  bp controlled and tolerating the repatha without problems    Denies GI or  issues outside of stress incontinence    Past Medical History:   Diagnosis Date    Allergic rhinitis     s/p desensitization Dr. Kimo Solitario Bursitis of shoulder, left, adhesive     Degenerative arthritis of cervical spine     C5-C7 Dr. Marily Mckeon Depression     FHx: heart disease     Gastritis     Dr. Nato Dawkins EGD 2009 w gr 1 esophagitis    GERD (gastroesophageal reflux disease)     s/p dilation Dr. Nato Dawkins 2012; esophagitis s/p dilation 3/21 Dr Rochelle Carcamo H/O cardiovascular stress test 07/2021    Dr Sherin Neal; NST low risk, ef 80%, no wma    H/O echocardiogram 07/2021    Dr Sherin Neal; nl lv, ef 62%, no valve prob    Hyperlipidemia     possible famillial hypercholesterolemia by [place] criteria (4)    Hypertension     IFG (impaired fasting glucose) 12/2013    on metformin 11/15    Mixed headache 10/11    Morbid obesity ([address]     peak weight 220 lbs, bmi 37.9 from 9/13; qsymia (9/13-3/15) and lost approx 15% wt; belviq no help; no contrave due to narc; IF 8/18 strat weight 222 lbs but stopped doing    Myofascial pain     Nephrolithiasis     FER (obstructive sleep apnea) 2007    and PLMD Dr. Denise Hardy now seeing Dr Irina Gil; AHI 20, on APAP for years, BiPAP titration 10/15 optimal pressure 11/7 cm     Osteoarthritis, knee     Dr Díaz Presumjamie R TKR, redo Dr Mg Alexis Osteopenia     Plantar fasciitis     LEFT    BRIANNA (stress urinary incontinence, female)      Past Surgical History:   Procedure Laterality Date    HX APPENDECTOMY      HX CARPAL TUNNEL RELEASE Right 2012    Dr. Dao Milan Erna Waggoner COLONOSCOPY      Dr. Simon Letters 12/12 negative    HX GI  2006    CT abd/pelvis negative    HX GYN      cervical surgery    HX KNEE ARTHROSCOPY  10/13    Right knee    HX KNEE REPLACEMENT Right     Dr Jaci Omer 4/19; Dr Rosetta Mai 9/20    HX UROLOGICAL      s/p urethral dilation    HX UROLOGICAL  12/17/15    [facility]-Cyto/Retro/Ureteroscopy/JJ Cath    ME CARDIAC SURG PROCEDURE UNLIST      thallium 2004 neg NST neg ef 86% 7/17     Social History     Socioeconomic History    Marital status:      Spouse name: Not on file    Number of children: 0    Years of education: Not on file    Highest education level: Not on file   Occupational History    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   Tobacco Use    Smoking status: Former Smoker    Smokeless tobacco: Never Used   Substance and Sexual Activity    Alcohol use: No    Drug use: No    Sexual activity: Not on file   Other Topics Concern    Not on file   Social History Narrative    Not on file     Social Determinants of Health     Financial Resource Strain:     Difficulty of Paying Living Expenses: Not on file   Food Insecurity:     Worried About [address] in the Last Year: Not on file    [address] in the Last Year: Not on file   Transportation Needs:     Lack of Transportation (Medical): Not on file    Lack of Transportation (Non-Medical):  Not on file   Physical Activity:     Days of Exercise per Week: Not on file    Minutes of Exercise per Session: Not on file   Stress:     Feeling of Stress : Not on file   Social Connections:     Frequency of Communication with Friends and Family: Not on file    Frequency of Social Gatherings with Friends and Family: Not on file    Attends Yazidi Services: Not on file    Active Member of Clubs or Organizations: Not on file    Attends Club or Organization Meetings: Not on file    Marital Status: Not on file   Intimate Partner Violence:     Fear of Current or Ex-Partner: Not on file    Emotionally Abused: Not on file    Physically Abused: Not on file    Sexually Abused: Not on file   Housing Stability:     Unable to Pay for Housing in the Last Year: Not on file    Number of Places Lived in the Last Year: Not on file    Unstable Housing in the Last Year: Not on file     Allergies   Allergen Reactions    Crestor [Rosuvastatin] Myalgia    Hydrochlorothiazide Other (comments)     Leg cramps    Lipitor [Atorvastatin] Myalgia    Pravastatin Myalgia    Sulfa (Sulfonamide Antibiotics) Rash and Itching    Verapamil Other (comments)     Bad taste, headache    Zocor [Simvastatin] Myalgia     Current Outpatient Medications   Medication Sig    armodafiniL 200 mg tab TAKE 1 TABLET (200 MG) BY ORAL ROUTE ONCE DAILY IN THE MORNING FOR 90 DAYS    gabapentin (NEURONTIN) 300 mg capsule TAKE 3 CAPSULES IN THE MORNING AND 4 CAPSULES IN THE EVENING    metFORMIN ER (GLUCOPHAGE XR) 500 mg tablet TAKE 2 TABLETS BY MOUTH DAILY WITH DINNER    valACYclovir (VALTREX) 1 gram tablet TAKE 1 TABLET BY MOUTH TWICE DAILY    amLODIPine (NORVASC) 5 mg tablet TAKE 1 TABLET BY MOUTH EVERY DAY    lisinopriL (PRINIVIL, ZESTRIL) 10 mg tablet TAKE 1 TABLET BY MOUTH EVERY DAY    traMADoL (ULTRAM) 50 mg tablet Take 50 mg by mouth as needed.  Repatha SureClick pen injection     aspirin delayed-release 81 mg tablet Take 81 mg by mouth daily.  acetaminophen (TYLENOL) 500 mg tablet Take 2 Tabs by mouth every six (6) hours.  metaxalone (SKELAXIN) 800 mg tablet Take 800 mg by mouth every eight (8) hours as needed.  omeprazole (PRILOSEC) 40 mg capsule Take 40 mg by mouth nightly.  montelukast (SINGULAIR) 10 mg tablet Take 10 mg by mouth nightly.  DULoxetine (CYMBALTA) 60 mg capsule Take 60 mg by mouth nightly. No current facility-administered medications for this visit.      REVIEW OF SYSTEMS: NICHOLASP Demetrio Mascorro 1/21, colo 12/12 Dr Mac Lomeli  Ophtho - no vision change or eye pain  Cardiac - no CP, PND, orthopnea, edema, palpitations or syncope  Resp - no wheezing, chronic coughing, dyspnea  GI - no heartburn, nausea, vomiting, change in bowel habits, bleeding, hemorrhoids  Urinary - no dysuria, hematuria, flank pain, urgency, frequency  Genitals - no genital lesions, discharge, masses, ulceration, warts    Visit Vitals  /77   Pulse 81   Temp 97 °F (36.1 °C) (Temporal)   Resp 16   Ht 5' 3\" (1.6 m)   Wt 228 lb (103.4 kg)   SpO2 96%   BMI 40.39 kg/m²   A&O x3  Affect is appropriate. Mood stable, in some pain from knee  No apparent distress  Anicteric, no JVD, adenopathy or thyromegaly. No carotid bruits or radiated murmur  Lungs clear to auscultation, no wheezes or rales  Heart showed regular rate and rhythm. No murmur, rubs, gallops  Abdomen soft nontender, no hepatosplenomegaly or masses. Extremities without edema.   Pulses 1-2+ symmetrically    LABS  From 5/10 showed   gluc 93,   cr 0.90,              alt 39,                        chol 195, tg 98,   hdl 72, ldl-c 103,  wbc 7.4, hb 12.4, plt 246, tsh 1.23  From 9/11 showed   gluc 95,   cr 0.62, gfr 107, alt 25,           ldl-p 1617,                                                         wbc 6.5, hb 12.3, plt 295, nikia neg, ra neg, esr 11, vit d 44.2  From 11/11 showed gluc 84,   cr 0.63, gfr 106,                    ck 51, navya 4.0  From 4/12 showed   gluc 94,   cr 0.69,      alt 26,           ldl-p 1197, chol 176, tg 59,   hdl 73, ldl-c 91  From 7/12 showed   gluc 78,   cr 0.80, gfr>60,  alt 47  From 5/13 showed                   ldl-p 985,   chol 202, tg 46,  hdl 74, ldl-c 119  From 12/13 showed gluc 102, cr 0.78, gfr 89,   alt 23,                chol 258, tg 95,   hdl 57, ldl-c 182,  wbc 6.3, hb 12.4, plt 311, ua neg  From 4/14 showed         hba1c 6.2,      chol 183, tg 90,   hdl 66, ldl-c 99  From 10/14 showed         hba1c 5.7,      chol 262, tg 79,   hdl 58, ldl-c 188   From 3/15 showed   gluc 91,   cr 0.69, gfr 101, alt 13,       chol 226, tg 78, hdl 67, ldl-c 143  From 7/15 showed         hba1c 5.9,      chol 265, tg 95,   hdl 68, ldl-c 178  From 9/15 showed   gluc 100, cr 0.62, gfr>60,  alt 12,                   wbc 7.1, hb 12.8, plt 286, tsh 0.78, proBNP 49  From 11/15 showed gluc 92,   cr 0.75, gfr 92,   alt 22, hba1c 6.3,      chol 267, tg 93,   hdl 74, ldl-c 174,         nikia neg, rf neg, esr 3, uric 5.5  From 12/15 showed                     ua neg   From 7/16 showed   gluc 96,   cr 0.66, gfr>60,  alt 40, hba1c 5.8,      chol 319, tg 116, hdl 68, ldl-c 228,           From 1/17 showed   gluc 98,   cr 0.72, gfr>60,  alt 47, hba1c 5.9,      chol 295, tg 121, hdl 69, ldl-c 202,         hep c-  From 6/17 showed         alt 35, hba1c 6.0,      chol 278, tg 118, hdl 63, ldl-c 191  From 9/17 showed                                   b12 520, fol 7  From 8/18 showed   glu 101,   cr 0.71, gfr>60,  alt 58, hba1c 5.9, umar 47  From 4/19 showed   gluc 98,   cr 0.60, gfr>60,  alt 43,                   wbc 5.9, hb 12.9, plt 288  From 9/20 showed   gluc 97,   cr 0.70, gfr>60,  alt 75, hba1c 5.9,                  wbc 6.7, hb 12.7, plt 264, fe 65, %sat 16, ferritin 63, b12 616, fol 14, spep neg, retic 1.1, mma 143  From 9/21 showed   gluc 102, cr 0.64, gfr>60,  alt 70, hba1c 5.9, umar 11,    chol 217, tg 88,  hdl 68, ldl-c 131    We reviewed the patient's labs from the last several visits to point out trends in the numbers          Patient Active Problem List   Diagnosis Code    Myofascial pain syndrome Dr. Koby Cary M79.18    FER/PLMD Dr. Tariq Cardenas 2007 now Dr. Vickie Michelle AHI 20 on BiPAP G47.33    Depression F32. A    IFG (impaired fasting glucose) R73.01    Essential hypertension I10    Gastroesophageal reflux disease without esophagitis K21.9    Hyperlipidemia E78.5    Severe obesity with body mass index (BMI) of 35.0 to 39.9 with serious comorbidity (HCC) E66.01    Osteoarthritis of right knee M17.11     ASSESSMENT AND PLAN  1. Obesity. Lifestyle and dietary measures. Portion control   2. Depression. Continue current  3. Hypertension. Controlled on current regimen. 4.  Cervical disc disease, myofascial pain syndrome. F/U with her specialists  5. FER/PLMD.  F/U Dr. Andrea Trujillo  6. Familial hypercholesterolemia. Check labs on repatha, follow up Dr Scar Bland  7. IFG. Wt loss, dietary and lifestyle measures. Continue metformin  8. R/O fatty liver; declined full evaluation/imaging due to cost issues, check serologies next draw    NEW ISSUES  9. Ophth. She is felt to be average risk for the planned procedure, no contraindications noted. RTC 9/22    Above conditions discussed at length and patient vocalized understanding. All questions answered to patient satisfaction        ICD-10-CM ICD-9-CM    1. Preop exam for internal medicine  Z01.818 V72.83    2.  Age-related cataract of both eyes, unspecified age-related cataract type  H25.9 366.10

## 2022-04-28 ENCOUNTER — OFFICE VISIT (OUTPATIENT)
Dept: INTERNAL MEDICINE CLINIC | Age: 57
End: 2022-04-28
Payer: COMMERCIAL

## 2022-04-28 VITALS
BODY MASS INDEX: 40.4 KG/M2 | HEIGHT: 63 IN | DIASTOLIC BLOOD PRESSURE: 77 MMHG | OXYGEN SATURATION: 96 % | HEART RATE: 81 BPM | TEMPERATURE: 97 F | RESPIRATION RATE: 16 BRPM | SYSTOLIC BLOOD PRESSURE: 125 MMHG | WEIGHT: 228 LBS

## 2022-04-28 DIAGNOSIS — Z01.818 PREOP EXAM FOR INTERNAL MEDICINE: Primary | ICD-10-CM

## 2022-04-28 DIAGNOSIS — H25.9 AGE-RELATED CATARACT OF BOTH EYES, UNSPECIFIED AGE-RELATED CATARACT TYPE: ICD-10-CM

## 2022-04-28 PROCEDURE — 99213 OFFICE O/P EST LOW 20 MIN: CPT | Performed by: INTERNAL MEDICINE

## 2022-04-28 RX ORDER — GABAPENTIN 300 MG/1
CAPSULE ORAL
COMMUNITY
Start: 2022-03-28

## 2022-04-28 RX ORDER — ARMODAFINIL 200 MG/1
TABLET ORAL
COMMUNITY
Start: 2022-04-14

## 2022-04-28 NOTE — PROGRESS NOTES
Pushpa Coulter presents with   Chief Complaint   Patient presents with    Pre-op Exam     scheduled 5-10-22 for cataract surgery on L eye with Dr. Emma Morales            1. \"Have you been to the ER, urgent care clinic since your last visit? Hospitalized since your last visit? \" No    2. \"Have you seen or consulted any other health care providers outside of the [address] [place] [address] since your last visit? \" Yes, Dr. Emma Morales     3. For patients aged 39-70: Has the patient had a colonoscopy / FIT/ Cologuard? Yes - Care Gap present. Most recent result on file      If the patient is female:    4. For patients aged 41-77: Has the patient had a mammogram within the past 2 years? Yes - no Care Gap present      5. For patients aged 21-65: Has the patient had a pap smear? Yes - Care Gap present.  Most recent result on file

## 2022-07-30 ENCOUNTER — HOSPITAL ENCOUNTER (OUTPATIENT)
Dept: LAB | Age: 57
Discharge: HOME | End: 2022-07-30
Payer: COMMERCIAL

## 2022-07-30 DIAGNOSIS — Z00.00 PHYSICAL EXAM: ICD-10-CM

## 2022-07-30 DIAGNOSIS — R73.01 IFG (IMPAIRED FASTING GLUCOSE): ICD-10-CM

## 2022-07-30 DIAGNOSIS — R74.01 TRANSAMINASEMIA: ICD-10-CM

## 2022-07-30 DIAGNOSIS — E78.01 FAMILIAL HYPERCHOLESTEROLEMIA: ICD-10-CM

## 2022-07-30 LAB
ALBUMIN SERPL-MCNC: 3.8 G/DL (ref 3.4–5)
ALBUMIN/GLOB SERPL: 1.2 {RATIO} (ref 0.8–1.7)
ALP SERPL-CCNC: 99 U/L (ref 45–117)
ALT SERPL-CCNC: 60 U/L (ref 13–56)
ANION GAP SERPL CALC-SCNC: 7 MMOL/L (ref 3–18)
AST SERPL-CCNC: 31 U/L (ref 10–38)
BILIRUB SERPL-MCNC: 0.4 MG/DL (ref 0.2–1)
BUN SERPL-MCNC: 16 MG/DL (ref 7–18)
BUN/CREAT SERPL: 28 (ref 12–20)
CALCIUM SERPL-MCNC: 8.7 MG/DL (ref 8.5–10.1)
CHLORIDE SERPL-SCNC: 108 MMOL/L (ref 100–111)
CHOLEST SERPL-MCNC: 267 MG/DL
CO2 SERPL-SCNC: 26 MMOL/L (ref 21–32)
CREAT SERPL-MCNC: 0.58 MG/DL (ref 0.6–1.3)
ERYTHROCYTE [DISTWIDTH] IN BLOOD BY AUTOMATED COUNT: 13.7 % (ref 11.6–14.5)
EST. AVERAGE GLUCOSE BLD GHB EST-MCNC: 128 MG/DL
GLOBULIN SER CALC-MCNC: 3.1 G/DL (ref 2–4)
GLUCOSE SERPL-MCNC: 112 MG/DL (ref 74–99)
HBA1C MFR BLD: 6.1 % (ref 4.2–5.6)
HCT VFR BLD AUTO: 37.2 % (ref 35–45)
HDLC SERPL-MCNC: 58 MG/DL (ref 40–60)
HDLC SERPL: 4.6 {RATIO} (ref 0–5)
HGB BLD-MCNC: 11.7 G/DL (ref 12–16)
LDLC SERPL CALC-MCNC: 184 MG/DL (ref 0–100)
LIPID PROFILE,FLP: ABNORMAL
MCH RBC QN AUTO: 27 PG (ref 24–34)
MCHC RBC AUTO-ENTMCNC: 31.5 G/DL (ref 31–37)
MCV RBC AUTO: 85.9 FL (ref 78–100)
NRBC # BLD: 0 K/UL (ref 0–0.01)
NRBC BLD-RTO: 0 PER 100 WBC
PLATELET # BLD AUTO: 253 K/UL (ref 135–420)
PMV BLD AUTO: 10.4 FL (ref 9.2–11.8)
POTASSIUM SERPL-SCNC: 4.4 MMOL/L (ref 3.5–5.5)
PROT SERPL-MCNC: 6.9 G/DL (ref 6.4–8.2)
RBC # BLD AUTO: 4.33 M/UL (ref 4.2–5.3)
SODIUM SERPL-SCNC: 141 MMOL/L (ref 136–145)
TRIGL SERPL-MCNC: 125 MG/DL (ref ?–150)
VLDLC SERPL CALC-MCNC: 25 MG/DL
WBC # BLD AUTO: 5.3 K/UL (ref 4.6–13.2)

## 2022-07-30 PROCEDURE — 80053 COMPREHEN METABOLIC PANEL: CPT

## 2022-07-30 PROCEDURE — 86381 MITOCHONDRIAL ANTIBODY EACH: CPT

## 2022-07-30 PROCEDURE — 36415 COLL VENOUS BLD VENIPUNCTURE: CPT

## 2022-07-30 PROCEDURE — 82390 ASSAY OF CERULOPLASMIN: CPT

## 2022-07-30 PROCEDURE — 80061 LIPID PANEL: CPT

## 2022-07-30 PROCEDURE — 85027 COMPLETE CBC AUTOMATED: CPT

## 2022-07-30 PROCEDURE — 83036 HEMOGLOBIN GLYCOSYLATED A1C: CPT

## 2022-07-30 PROCEDURE — 87340 HEPATITIS B SURFACE AG IA: CPT

## 2022-07-31 LAB — CERULOPLASMIN SERPL-MCNC: 20.6 MG/DL (ref 19–39)

## 2022-08-01 LAB
HBV SURFACE AG SER QL: <0.1 INDEX
HBV SURFACE AG SER QL: NEGATIVE
MITOCHONDRIA M2 IGG SER-ACNC: <20 UNITS (ref 0–20)

## 2022-09-09 ENCOUNTER — TELEPHONE (OUTPATIENT)
Dept: INTERNAL MEDICINE CLINIC | Age: 57
End: 2022-09-09

## 2022-09-09 NOTE — TELEPHONE ENCOUNTER
Pt calling to let RD know she is having Cardiac bypass surgery on 9/28 by Dr. Evelin Kelley at [facility]

## 2022-10-09 DIAGNOSIS — I10 ESSENTIAL HYPERTENSION: ICD-10-CM

## 2022-10-10 RX ORDER — LISINOPRIL 10 MG/1
TABLET ORAL
Qty: 90 TABLET | Refills: 3 | Status: SHIPPED | OUTPATIENT
Start: 2022-10-10

## 2022-10-10 RX ORDER — AMLODIPINE BESYLATE 5 MG/1
TABLET ORAL
Qty: 90 TABLET | Refills: 3 | Status: SHIPPED | OUTPATIENT
Start: 2022-10-10

## 2022-11-03 NOTE — PROGRESS NOTES
59 y.o. white female who presents for evaluation    She was noted to have new onset LBBB along with non spec fatigue, dec energy, malaise, dec exercise capacity. She underwent cath by Dr Radu Kirby with results below. She then underwent CABG by Dr Delphine Lebron and reports that she has felt the best she felt in years. Energy levels and dyspnea have resolved. They are going to get her on inclisiran for chol;. She is doing well in cardiac rehab    Denies GI or  issues. She is due for colo but holding off until next year    Past Medical History:   Diagnosis Date    Allergic rhinitis     s/p immunotherapy Dr. Singh Lees    Bursitis of shoulder, left, adhesive     CAD (coronary artery disease) 10/2022    s/p 2V CABG Dr Delphine Lebron; RAMIREZ to LAD, SVG to RCA    Degenerative arthritis of cervical spine     C5-C7 Dr. Phillis Severs liver 04/2021    on [facility] w hepatolmegaly; serologies neg    FHx: heart disease     Gastritis     Dr. Elba Portillo EGD 2009 w gr 1 esophagitis    GERD (gastroesophageal reflux disease)     s/p dilation Dr. Elba Portillo 2012; esophagitis s/p dilation 3/21 Dr Elba Portillo    H/O cardiac catheterization 08/2022    50-60% LAD, 70% ostial DM1, 30% OM2, 90% midRCA Dr Radu Kirby    H/O cardiovascular stress test     thallium (2004) neg;  NST neg ef 86% (7/17);  Dr Tory Ramos; NST low risk, ef 80%, no wma (7/21)    H/O echocardiogram     Dr Tory Ramos; nl lv, ef 62%, no valve prob (7/21); ef 64%, cLVH, thickened sept/post wall, no wma (9/22)    Hyperlipidemia     possible famillial hypercholesterolemia by [place] criteria (4)    Hypertension     IFG (impaired fasting glucose) 12/2013    on metformin 11/15    Mixed headache 10/2011    Morbid obesity ([address]     peak weight 220 lbs, bmi 37.9 from 9/13; qsymia (9/13-3/15) and lost approx 15% wt; belviq no help; no contrave due to narc; IF 8/18 strat weight 222 lbs but stopped doing    Myofascial pain     Nephrolithiasis     FER (obstructive sleep apnea) 2007    and PLMD Dr. Omar Rosario now seeing Dr Linwood Arechiga Kenan; AHI 20, on APAP for years, BiPAP titration 10/15 optimal pressure 11/7 cm     Osteoarthritis, knee     Dr Kb Reis R TKR, redo Dr Norah Metcalf    Osteopenia     Plantar fasciitis     LEFT    BRIANNA (stress urinary incontinence, female)      Past Surgical History:   Procedure Laterality Date    HX APPENDECTOMY      HX Mignon Martínezwall Right 2012    Dr. Yun Hammond Bilateral     Dr Xochitl Bales 12/12 negative    HX CORONARY ARTERY BYPASS GRAFT  10/2022    Dr Sravan RAMIREZ to LAD, SVG to RCA    HX GYN      cervical surgery    HX KNEE ARTHROSCOPY Right 10/2013    HX Zion Guise Right     Dr Kb Reis 4/19; Dr Renuka York 9/20    HX UROLOGICAL      s/p urethral dilation    HX UROLOGICAL  12/17/2015    [facility]-Cyto/Retro/Ureteroscopy/JJ Cath     Social History     Socioeconomic History    Marital status:      Spouse name: Not on file    Number of children: 0    Years of education: Not on file    Highest education level: Not on file   Occupational History    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   Tobacco Use    Smoking status: Former    Smokeless tobacco: Never   Substance and Sexual Activity    Alcohol use: No    Drug use: No    Sexual activity: Not on file   Other Topics Concern    Not on file   Social History Narrative    Not on file     Social Determinants of Health     Financial Resource Strain: Not on file   Food Insecurity: Not on file   Transportation Needs: Not on file   Physical Activity: Not on file   Stress: Not on file   Social Connections: Not on file   Intimate Partner Violence: Not on file   Housing Stability: Not on file     Allergies   Allergen Reactions    Adhesive Itching, Rash and Swelling    Crestor [Rosuvastatin] Myalgia    Hydrochlorothiazide Other (comments)     Leg cramps    Lipitor [Atorvastatin] Myalgia    Pravastatin Myalgia    Sulfa (Sulfonamide Antibiotics) Rash and Itching    Verapamil Other (comments)     Bad taste, headache    Zocor [Simvastatin] Myalgia     Current Outpatient Medications   Medication Sig    DULoxetine (Cymbalta) 30 mg capsule Take 30 mg by mouth daily. Take 1 tablet every morning    ezetimibe (ZETIA) 10 mg tablet Take 10 mg by mouth daily. inclisiran (Leqvio) 284 mg/1.5 mL syrg injection 284 mg by SubCUTAneous route. metoprolol succinate (TOPROL-XL) 25 mg XL tablet Take 25 mg by mouth nightly. lisinopriL (PRINIVIL, ZESTRIL) 10 mg tablet TAKE 1 TABLET BY MOUTH EVERY DAY    armodafiniL 200 mg tab TAKE 1 TABLET (200 MG) BY ORAL ROUTE ONCE DAILY IN THE MORNING FOR 90 DAYS    gabapentin (NEURONTIN) 300 mg capsule TAKE 3 CAPSULES IN THE MORNING AND 4 CAPSULES IN THE EVENING    metFORMIN ER (GLUCOPHAGE XR) 500 mg tablet TAKE 2 TABLETS BY MOUTH DAILY WITH DINNER    valACYclovir (VALTREX) 1 gram tablet TAKE 1 TABLET BY MOUTH TWICE DAILY    traMADoL (ULTRAM) 50 mg tablet Take 50 mg by mouth as needed. aspirin delayed-release 81 mg tablet Take 81 mg by mouth daily. acetaminophen (TYLENOL) 500 mg tablet Take 2 Tabs by mouth every six (6) hours. metaxalone (SKELAXIN) 800 mg tablet Take 800 mg by mouth every eight (8) hours as needed. omeprazole (PRILOSEC) 40 mg capsule Take 40 mg by mouth nightly. montelukast (SINGULAIR) 10 mg tablet Take 10 mg by mouth nightly. No current facility-administered medications for this visit.      REVIEW OF SYSTEMS: FNP Shoshana Sale 1/21, colo 12/12 Dr Katy Walker  Ophtho - no vision change or eye pain  Cardiac - no CP, PND, orthopnea, edema, palpitations or syncope  Resp - no wheezing, chronic coughing, dyspnea  GI - no heartburn, nausea, vomiting, change in bowel habits, bleeding, hemorrhoids  Urinary - no dysuria, hematuria, flank pain, urgency, frequency  Genitals - no genital lesions, discharge, masses, ulceration, warts    Visit Vitals  /84   Pulse 82   Temp 97.8 °F (36.6 °C) (Temporal)   Resp 17   Ht 5' 3\" (1.6 m)   Wt 221 lb (100.2 kg)   SpO2 98%   BMI 39.15 kg/m²     A&O x3  Affect is appropriate. Mood stable, in some pain from knee  No apparent distress  Anicteric, no JVD, adenopathy or thyromegaly. No carotid bruits or radiated murmur  Lungs clear to auscultation, no wheezes or rales  Heart showed regular rate and rhythm. No murmur, rubs, gallops  Abdomen soft nontender, no hepatosplenomegaly or masses. Extremities without edema.   Pulses 1-2+ symmetrically    LABS  From 5/10 showed   gluc 93,   cr 0.90,              alt 39,                        chol 195, tg 98,   hdl 72, ldl-c 103,  wbc 7.4, hb 12.4, plt 246, tsh 1.23  From 9/11 showed   gluc 95,   cr 0.62, gfr 107, alt 25,           ldl-p 1617,                                                         wbc 6.5, hb 12.3, plt 295, nikia neg, ra neg, esr 11, vit d 44.2  From 11/11 showed gluc 84,   cr 0.63, gfr 106,                     ck 51, navya 4.0  From 4/12 showed   gluc 94,   cr 0.69,      alt 26,           ldl-p 1197, chol 176, tg 59,   hdl 73, ldl-c 91  From 7/12 showed   gluc 78,   cr 0.80, gfr>60,  alt 47  From 5/13 showed                   ldl-p 985,   chol 202, tg 46,  hdl 74, ldl-c 119  From 12/13 showed gluc 102, cr 0.78, gfr 89,   alt 23,                chol 258, tg 95,   hdl 57, ldl-c 182,  wbc 6.3, hb 12.4, plt 311, ua neg  From 4/14 showed         hba1c 6.2,      chol 183, tg 90,   hdl 66, ldl-c 99  From 10/14 showed         hba1c 5.7,      chol 262, tg 79,   hdl 58, ldl-c 188   From 3/15 showed   gluc 91,   cr 0.69, gfr 101, alt 13,       chol 226, tg 78,   hdl 67, ldl-c 143  From 7/15 showed         hba1c 5.9,      chol 265, tg 95,   hdl 68, ldl-c 178  From 9/15 showed   gluc 100, cr 0.62, gfr>60,  alt 12,                   wbc 7.1, hb 12.8, plt 286, tsh 0.78, proBNP 49  From 11/15 showed gluc 92,   cr 0.75, gfr 92,   alt 22, hba1c 6.3,      chol 267, tg 93,   hdl 74, ldl-c 174,          nikia neg, rf neg, esr 3, uric 5.5  From 12/15 showed                      ua neg   From 7/16 showed   gluc 96, cr 0.66, gfr>60,  alt 40, hba1c 5.8,      chol 319, tg 116, hdl 68, ldl-c 228,           From 1/17 showed   gluc 98,   cr 0.72, gfr>60,  alt 47, hba1c 5.9,      chol 295, tg 121, hdl 69, ldl-c 202,          hep c-  From 6/17 showed          alt 35, hba1c 6.0,      chol 278, tg 118, hdl 63, ldl-c 191  From 9/17 showed                                    b12 520, fol 7  From 8/18 showed   glu 101,   cr 0.71, gfr>60,  alt 58, hba1c 5.9, umar 47  From 4/19 showed   gluc 98,   cr 0.60, gfr>60,  alt 43,                   wbc 5.9, hb 12.9, plt 288  From 9/20 showed   gluc 97,   cr 0.70, gfr>60,  alt 75, hba1c 5.9,                  wbc 6.7, hb 12.7, plt 264, fe 65, %sat 16, ferritin 63, b12 616, fol 14, spep neg, retic 1.1, mma 143  From 9/21 showed   gluc 102, cr 0.64, gfr>60,  alt 70, hba1c 5.9, umar 11,   chol 217, tg 88,   hdl 68, ldl-c 131  From 7/22 showed   gluc 112, cr 0.58, gfr>60,  alt 60, hba1c 6.1,      chol 267, tg 125, hdl 58, ldl-c 184, wbc 5.3, hb 11.7, plt 253, ast 31, ap 99, tb 0.4, hep b-, cerulo 20, ama neg  From 9/22 showed   gluc 121, cr 0.60, gfr>60,  alt 61, hba1c 6.2,               tsh 1.03    We reviewed the patient's labs from the last several visits to point out trends in the numbers          Patient Active Problem List   Diagnosis Code    Myofascial pain syndrome Dr. Sheridan Baldwin M79.18    FER/PLMD Dr. Ignacia Kasper 2007 now Dr. Becky Shelton 20 on BiPAP G47.33    Depression F32. A    IFG (impaired fasting glucose) R73.01    Essential hypertension I10    Gastroesophageal reflux disease without esophagitis K21.9    Hyperlipidemia E78.5    Severe obesity with body mass index (BMI) of 35.0 to 39.9 with serious comorbidity (HCC) E66.01    Osteoarthritis of right knee M17.11    S/P CABG x 2 Z95.1    Fatty liver K76.0     ASSESSMENT AND PLAN  1. Obesity. Lifestyle and dietary measures. Portion control   2. Depression. Continue current  3. Hypertension. Controlled on current regimen.   4.  Cervical disc disease, myofascial pain syndrome. F/U with her specialists  5. FER/PLMD.  F/U Dr. Lin Ibanez  6. Familial hypercholesterolemia. She will start on inclisiran with Dr Tae Infante  7. IFG. Wt loss, dietary and lifestyle measures. Continue metformin  8. Fatty liver. Wt loss  9. She will scheduled follow up colo herself        RTC 3/23    Above conditions discussed at length and patient vocalized understanding. All questions answered to patient satisfaction        ICD-10-CM ICD-9-CM    1. S/P CABG x 2  Z95.1 V45.81       2. Severe obesity with body mass index (BMI) of 35.0 to 39.9 with serious comorbidity (HCC)  E66.01 278.01       3. Familial hypercholesterolemia  E78.01 272.0 CBC W/O DIFF      METABOLIC PANEL, COMPREHENSIVE      LIPID PANEL      4. Essential hypertension  I10 401.9       5. IFG (impaired fasting glucose)  R73.01 790.21 HEMOGLOBIN A1C WITH EAG      6. FER/PLMD Dr. Zoie Ayala 2007 now Dr. Lin Ibanez AHI 20 on BiPAP  G47.33 327.23       7. Fatty liver  K76.0 571.8       8.  Screening mammogram for breast cancer  Z12.31 V76.12 NAEL MAMMO BI SCREENING INCL CAD

## 2022-11-09 NOTE — PROGRESS NOTES
Milagro Angeli Coulter presents today for   Chief Complaint   Patient presents with    Hospital Follow Up     1 month follow up post- CABG w Dr. Melanie Robbins 9-28-22 @ [facility]        1. \"Have you been to the ER, urgent care clinic since your last visit? Hospitalized since your last visit? \" Yes 9-28-22 @ [facility] CABG     2. \"Have you seen or consulted any other health care providers outside of the [address] [place] [address] since your last visit? \" Yes      3. For patients aged 39-70: Has the patient had a colonoscopy / FIT/ Cologuard? No      If the patient is female:    4. For patients aged 41-77: Has the patient had a mammogram within the past 2 years? Yes - no Care Gap present  See top three    5. For patients aged 21-65: Has the patient had a pap smear?  No

## 2022-11-10 ENCOUNTER — OFFICE VISIT (OUTPATIENT)
Dept: INTERNAL MEDICINE CLINIC | Age: 57
End: 2022-11-10
Payer: COMMERCIAL

## 2022-11-10 VITALS
BODY MASS INDEX: 39.16 KG/M2 | RESPIRATION RATE: 17 BRPM | SYSTOLIC BLOOD PRESSURE: 133 MMHG | DIASTOLIC BLOOD PRESSURE: 84 MMHG | OXYGEN SATURATION: 98 % | WEIGHT: 221 LBS | TEMPERATURE: 97.8 F | HEART RATE: 82 BPM | HEIGHT: 63 IN

## 2022-11-10 DIAGNOSIS — I10 ESSENTIAL HYPERTENSION: ICD-10-CM

## 2022-11-10 DIAGNOSIS — Z95.1 S/P CABG X 2: Primary | ICD-10-CM

## 2022-11-10 DIAGNOSIS — K76.0 FATTY LIVER: ICD-10-CM

## 2022-11-10 DIAGNOSIS — E66.01 SEVERE OBESITY WITH BODY MASS INDEX (BMI) OF 35.0 TO 39.9 WITH SERIOUS COMORBIDITY (HCC): ICD-10-CM

## 2022-11-10 DIAGNOSIS — E78.01 FAMILIAL HYPERCHOLESTEROLEMIA: ICD-10-CM

## 2022-11-10 DIAGNOSIS — G47.33 OSA (OBSTRUCTIVE SLEEP APNEA): ICD-10-CM

## 2022-11-10 DIAGNOSIS — R73.01 IFG (IMPAIRED FASTING GLUCOSE): ICD-10-CM

## 2022-11-10 DIAGNOSIS — Z12.31 SCREENING MAMMOGRAM FOR BREAST CANCER: ICD-10-CM

## 2022-11-10 PROCEDURE — 3074F SYST BP LT 130 MM HG: CPT | Performed by: INTERNAL MEDICINE

## 2022-11-10 PROCEDURE — 3078F DIAST BP <80 MM HG: CPT | Performed by: INTERNAL MEDICINE

## 2022-11-10 PROCEDURE — 99214 OFFICE O/P EST MOD 30 MIN: CPT | Performed by: INTERNAL MEDICINE

## 2022-11-10 RX ORDER — EZETIMIBE 10 MG/1
10 TABLET ORAL DAILY
COMMUNITY
Start: 2022-08-17

## 2022-11-10 RX ORDER — INCLISIRAN 284 MG/1.5ML
284 INJECTION, SOLUTION SUBCUTANEOUS
COMMUNITY
Start: 2022-11-10

## 2022-11-10 RX ORDER — METOPROLOL SUCCINATE 25 MG/1
25 TABLET, EXTENDED RELEASE ORAL
COMMUNITY
Start: 2022-10-31

## 2022-11-10 RX ORDER — DULOXETIN HYDROCHLORIDE 30 MG/1
30 CAPSULE, DELAYED RELEASE ORAL DAILY
COMMUNITY

## 2022-11-13 DIAGNOSIS — Z12.31 SCREENING MAMMOGRAM FOR BREAST CANCER: ICD-10-CM

## 2022-11-13 PROBLEM — Z95.1 S/P CABG X 2: Status: ACTIVE | Noted: 2022-11-13

## 2022-11-13 PROBLEM — K76.0 FATTY LIVER: Status: ACTIVE | Noted: 2021-04-01

## 2022-12-19 ENCOUNTER — TELEPHONE (OUTPATIENT)
Dept: INTERNAL MEDICINE CLINIC | Age: 57
End: 2022-12-19

## 2022-12-19 DIAGNOSIS — U07.1 COVID-19 VIRUS INFECTION: Primary | ICD-10-CM

## 2022-12-19 DIAGNOSIS — R05.9 COUGH, UNSPECIFIED TYPE: ICD-10-CM

## 2022-12-19 RX ORDER — CODEINE PHOSPHATE AND GUAIFENESIN 10; 100 MG/5ML; MG/5ML
5 SOLUTION ORAL
Qty: 105 ML | Refills: 0 | Status: SHIPPED | OUTPATIENT
Start: 2022-12-19 | End: 2022-12-26

## 2022-12-19 NOTE — TELEPHONE ENCOUNTER
Patient is calling stating she tested positive for COVID this morning. Saturday she started having light clearing of the throat and a tickle. Today she has a really deep cough. Coughing up light yellow mucus. She also has a fever of 100.0. Wants to know if something can be sent in for the cough.

## 2022-12-27 DIAGNOSIS — R73.01 IFG (IMPAIRED FASTING GLUCOSE): ICD-10-CM

## 2022-12-27 RX ORDER — METFORMIN HYDROCHLORIDE 500 MG/1
TABLET, EXTENDED RELEASE ORAL
Qty: 180 TABLET | Refills: 3 | Status: SHIPPED | OUTPATIENT
Start: 2022-12-27

## 2023-01-28 ENCOUNTER — TRANSCRIBE ORDERS (OUTPATIENT)
Facility: HOSPITAL | Age: 58
End: 2023-01-28

## 2023-01-28 DIAGNOSIS — Z12.31 ENCOUNTER FOR SCREENING MAMMOGRAM FOR MALIGNANT NEOPLASM OF BREAST: Primary | ICD-10-CM

## 2023-02-04 DIAGNOSIS — E78.01 FAMILIAL HYPERCHOLESTEROLEMIA: Primary | ICD-10-CM

## 2023-02-05 DIAGNOSIS — E78.01 FAMILIAL HYPERCHOLESTEROLEMIA: Primary | ICD-10-CM

## 2023-02-06 DIAGNOSIS — R73.01 IFG (IMPAIRED FASTING GLUCOSE): Primary | ICD-10-CM

## 2023-02-13 ENCOUNTER — HOSPITAL ENCOUNTER (OUTPATIENT)
Facility: HOSPITAL | Age: 58
Discharge: HOME | End: 2023-02-16
Payer: COMMERCIAL

## 2023-02-13 DIAGNOSIS — Z12.31 ENCOUNTER FOR SCREENING MAMMOGRAM FOR MALIGNANT NEOPLASM OF BREAST: ICD-10-CM

## 2023-02-13 PROCEDURE — 77063 BREAST TOMOSYNTHESIS BI: CPT

## 2023-03-15 ENCOUNTER — TELEMEDICINE (OUTPATIENT)
Age: 58
End: 2023-03-15
Payer: COMMERCIAL

## 2023-03-15 DIAGNOSIS — J06.9 URTI (ACUTE UPPER RESPIRATORY INFECTION): Primary | ICD-10-CM

## 2023-03-15 DIAGNOSIS — R09.81 NASAL CONGESTION: ICD-10-CM

## 2023-03-15 PROCEDURE — 99213 OFFICE O/P EST LOW 20 MIN: CPT | Performed by: INTERNAL MEDICINE

## 2023-03-15 RX ORDER — CETIRIZINE HYDROCHLORIDE 10 MG/1
10 TABLET ORAL DAILY
Qty: 30 TABLET | Refills: 0 | Status: SHIPPED | OUTPATIENT
Start: 2023-03-15 | End: 2023-04-14

## 2023-03-15 RX ORDER — FLUTICASONE PROPIONATE 50 MCG
1 SPRAY, SUSPENSION (ML) NASAL DAILY
Qty: 32 G | Refills: 0 | Status: SHIPPED | OUTPATIENT
Start: 2023-03-15

## 2023-03-15 NOTE — PROGRESS NOTES
Margiedwayne Bo presents today for   Chief Complaint   Patient presents with    Fever                 1. \"Have you been to the ER, urgent care clinic since your last visit? Hospitalized since your last visit? \" no    2. \"Have you seen or consulted any other health care providers outside of the [address] [place] [address] since your last visit? \" no     3. For patients aged 39-70: Has the patient had a colonoscopy / FIT/ Cologuard? Yes - Care Gap present. Rooming MA/LPN to request most recent results      If the patient is female:    4. For patients aged 41-77: Has the patient had a mammogram within the past 2 years? Yes - Care Gap present. Rooming MA/LPN to request most recent results      5. For patients aged 21-65: Has the patient had a pap smear? Yes - Care Gap present.  Rooming MA/LPN to request most recent results

## 2023-03-15 NOTE — PROGRESS NOTES
Flower Bo (:  1965) is a Established patient, here for evaluation of the following:    Assessment & Plan   Below is the assessment and plan developed based on review of pertinent history, physical exam, labs, studies, and medications. 1. URTI (acute upper respiratory infection)  Assessment & Plan:  Recommended patient to get flu swab done and let us know the results. Increase oral fluid intake. Take Tylenol for fever/body pain. To seek help if does not start to feel better in next couple of days. Orders:  -     cetirizine (ZYRTEC) 10 MG tablet; Take 1 tablet by mouth daily, Disp-30 tablet, R-0Normal  -     fluticasone (FLONASE) 50 MCG/ACT nasal spray; 1 spray by Each Nostril route daily, Disp-32 g, R-0Normal  2. Nasal congestion  -     cetirizine (ZYRTEC) 10 MG tablet; Take 1 tablet by mouth daily, Disp-30 tablet, R-0Normal  -     fluticasone (FLONASE) 50 MCG/ACT nasal spray; 1 spray by Each Nostril route daily, Disp-32 g, R-0Normal    No follow-ups on file. Subjective   HPI  Patient is complaining of scratchy throat for last 2 days which started on Monday. With mild temperature of 99. On Tuesday the temperature went high to 101.4. Patient also complains of nasal congestion, some fullness in the sinuses and postnasal drip. Patient also taking Singulair. Patient has not tried Zyrtec or Flonase. There is no nausea or vomiting or chest pain or shortness of breath. Patient does not have any cough or sputum. Patient continues to have low-grade fever and scratchy throat. Patient is vaccinated with 2 series injections and 1 booster of COVID-vaccine. Patient has not had flu shot.     Patient had COVID test today morning which is negative        ROS as above       Objective   Patient-Reported Vitals  No data recorded     Physical Exam  [INSTRUCTIONS:  \"[x]\" Indicates a positive item  \"[]\" Indicates a negative item  -- DELETE ALL ITEMS NOT EXAMINED]    Constitutional: [x] Appears well-developed and well-nourished [x] No apparent distress      [] Abnormal -     Mental status: [x] Alert and awake  [x] Oriented to person/place/time [x] Able to follow commands    [] Abnormal -     Eyes:   EOM    [x]  Normal    [] Abnormal -   Sclera  [x]  Normal    [] Abnormal -          Discharge [x]  None visible   [] Abnormal -     HENT: [x] Normocephalic, atraumatic  [] Abnormal -   [x] Mouth/Throat: Mucous membranes are moist    External Ears [x] Normal  [] Abnormal -    Neck: [x] No visualized mass [] Abnormal -     Pulmonary/Chest: [x] Respiratory effort normal   [x] No visualized signs of difficulty breathing or respiratory distress        [] Abnormal -      Musculoskeletal:   [x] Normal gait with no signs of ataxia         [x] Normal range of motion of neck        [] Abnormal -     Neurological:        [x] No Facial Asymmetry (Cranial nerve 7 motor function) (limited exam due to video visit)          [x] No gaze palsy        [] Abnormal -          Skin:        [x] No significant exanthematous lesions or discoloration noted on facial skin         [] Abnormal -            Psychiatric:       [x] Normal Affect [] Abnormal -        [x] No Hallucinations    Other pertinent observable physical exam findings:-         On this date 3/15/2023 I have spent 21 minutes reviewing previous notes, test results and face to face (virtual) with the patient discussing the diagnosis and importance of compliance with the treatment plan as well as documenting on the day of the visit.    Flower Bo, was evaluated through a synchronous (real-time) audio-video encounter. The patient (or guardian if applicable) is aware that this is a billable service, which includes applicable co-pays. This Virtual Visit was conducted with patient's (and/or legal guardian's) consent. The visit was conducted pursuant to the emergency declaration under the Boyce Act and the National Emergencies Act, 1135 waiver authority and  the Joaquín Kofax and Yeelion General Act. Patient identification was verified, and a caregiver was present when appropriate.    The patient was located at Home: [address] [place] [address]  Provider was located at [place] [address] ([address] [place] [address]): [address],  [address]         --Kevin Anne MD

## 2023-03-15 NOTE — ASSESSMENT & PLAN NOTE
Recommended patient to get flu swab done and let us know the results. Increase oral fluid intake. Take Tylenol for fever/body pain. To seek help if does not start to feel better in next couple of days.

## 2023-03-15 NOTE — LETTER
3/15/2023        Bautista Shows Anupam  [address] [facility] [place] [employer]    To Whom It May Concern:    Bautista Bo, date of birth 1965, is under the care of Xenia Mock. She is released to return to work on 3/17/23    Please have the patient contact our office if there are questions or concerns.       Sincerely,      Camilla Bravo MD

## 2023-05-18 DIAGNOSIS — J06.9 URTI (ACUTE UPPER RESPIRATORY INFECTION): ICD-10-CM

## 2023-05-18 DIAGNOSIS — R09.81 NASAL CONGESTION: ICD-10-CM

## 2023-05-19 RX ORDER — FLUTICASONE PROPIONATE 50 MCG
SPRAY, SUSPENSION (ML) NASAL
Qty: 32 G | Refills: 5 | Status: SHIPPED | OUTPATIENT
Start: 2023-05-19

## 2023-05-26 RX ORDER — VALACYCLOVIR HYDROCHLORIDE 1 G/1
TABLET, FILM COATED ORAL
Qty: 20 TABLET | Refills: 2 | Status: SHIPPED | OUTPATIENT
Start: 2023-05-26

## 2023-05-27 ENCOUNTER — HOSPITAL ENCOUNTER (OUTPATIENT)
Facility: HOSPITAL | Age: 58
Discharge: HOME | End: 2023-05-30
Payer: COMMERCIAL

## 2023-05-27 LAB
ALBUMIN SERPL-MCNC: 3.9 G/DL (ref 3.4–5)
ALBUMIN/GLOB SERPL: 1.2 (ref 0.8–1.7)
ALP SERPL-CCNC: 102 U/L (ref 45–117)
ALT SERPL-CCNC: 37 U/L (ref 13–56)
ANION GAP SERPL CALC-SCNC: 3 MMOL/L (ref 3–18)
AST SERPL-CCNC: 24 U/L (ref 10–38)
BILIRUB SERPL-MCNC: 0.4 MG/DL (ref 0.2–1)
BUN SERPL-MCNC: 15 MG/DL (ref 7–18)
BUN/CREAT SERPL: 23 (ref 12–20)
CALCIUM SERPL-MCNC: 9.5 MG/DL (ref 8.5–10.1)
CHLORIDE SERPL-SCNC: 108 MMOL/L (ref 100–111)
CO2 SERPL-SCNC: 29 MMOL/L (ref 21–32)
CREAT SERPL-MCNC: 0.66 MG/DL (ref 0.6–1.3)
ERYTHROCYTE [DISTWIDTH] IN BLOOD BY AUTOMATED COUNT: 15.2 % (ref 11.6–14.5)
GLOBULIN SER CALC-MCNC: 3.2 G/DL (ref 2–4)
GLUCOSE SERPL-MCNC: 106 MG/DL (ref 74–99)
HBA1C MFR BLD: 6 % (ref 4.2–5.6)
HCT VFR BLD AUTO: 40.1 % (ref 35–45)
HGB BLD-MCNC: 12.1 G/DL (ref 12–16)
MCH RBC QN AUTO: 25.2 PG (ref 24–34)
MCHC RBC AUTO-ENTMCNC: 30.2 G/DL (ref 31–37)
MCV RBC AUTO: 83.4 FL (ref 78–100)
NRBC # BLD: 0 K/UL (ref 0–0.01)
NRBC BLD-RTO: 0 PER 100 WBC
PLATELET # BLD AUTO: 283 K/UL (ref 135–420)
PMV BLD AUTO: 10.1 FL (ref 9.2–11.8)
POTASSIUM SERPL-SCNC: 4.6 MMOL/L (ref 3.5–5.5)
PROT SERPL-MCNC: 7.1 G/DL (ref 6.4–8.2)
RBC # BLD AUTO: 4.81 M/UL (ref 4.2–5.3)
SODIUM SERPL-SCNC: 140 MMOL/L (ref 136–145)
WBC # BLD AUTO: 5.8 K/UL (ref 4.6–13.2)

## 2023-05-27 PROCEDURE — 80053 COMPREHEN METABOLIC PANEL: CPT

## 2023-05-27 PROCEDURE — 80061 LIPID PANEL: CPT

## 2023-05-27 PROCEDURE — 83036 HEMOGLOBIN GLYCOSYLATED A1C: CPT

## 2023-05-27 PROCEDURE — 85027 COMPLETE CBC AUTOMATED: CPT

## 2023-05-27 PROCEDURE — 36415 COLL VENOUS BLD VENIPUNCTURE: CPT

## 2023-05-29 LAB
CHOLEST SERPL-MCNC: 185 MG/DL
HDLC SERPL-MCNC: 66 MG/DL (ref 40–60)
HDLC SERPL: 2.8 (ref 0–5)
LDLC SERPL CALC-MCNC: 93 MG/DL (ref 0–100)
LIPID PANEL: ABNORMAL
TRIGL SERPL-MCNC: 130 MG/DL
VLDLC SERPL CALC-MCNC: 26 MG/DL

## 2023-05-30 ENCOUNTER — OFFICE VISIT (OUTPATIENT)
Age: 58
End: 2023-05-30
Payer: COMMERCIAL

## 2023-05-30 VITALS
TEMPERATURE: 97.5 F | SYSTOLIC BLOOD PRESSURE: 137 MMHG | DIASTOLIC BLOOD PRESSURE: 84 MMHG | BODY MASS INDEX: 37.56 KG/M2 | OXYGEN SATURATION: 98 % | RESPIRATION RATE: 19 BRPM | HEIGHT: 63 IN | WEIGHT: 212 LBS | HEART RATE: 68 BPM

## 2023-05-30 DIAGNOSIS — E78.01 FAMILIAL HYPERCHOLESTEROLEMIA: ICD-10-CM

## 2023-05-30 DIAGNOSIS — K21.9 GASTROESOPHAGEAL REFLUX DISEASE WITHOUT ESOPHAGITIS: ICD-10-CM

## 2023-05-30 DIAGNOSIS — K76.0 FATTY LIVER: ICD-10-CM

## 2023-05-30 DIAGNOSIS — E66.01 SEVERE OBESITY WITH BODY MASS INDEX (BMI) OF 35.0 TO 39.9 WITH SERIOUS COMORBIDITY (HCC): ICD-10-CM

## 2023-05-30 DIAGNOSIS — Z95.1 S/P CABG X 2: ICD-10-CM

## 2023-05-30 DIAGNOSIS — I10 ESSENTIAL HYPERTENSION: Primary | ICD-10-CM

## 2023-05-30 DIAGNOSIS — R73.01 IFG (IMPAIRED FASTING GLUCOSE): ICD-10-CM

## 2023-05-30 PROBLEM — J06.9 URTI (ACUTE UPPER RESPIRATORY INFECTION): Status: RESOLVED | Noted: 2023-03-15 | Resolved: 2023-05-30

## 2023-05-30 PROCEDURE — 3075F SYST BP GE 130 - 139MM HG: CPT | Performed by: INTERNAL MEDICINE

## 2023-05-30 PROCEDURE — 99396 PREV VISIT EST AGE 40-64: CPT | Performed by: INTERNAL MEDICINE

## 2023-05-30 PROCEDURE — 3079F DIAST BP 80-89 MM HG: CPT | Performed by: INTERNAL MEDICINE

## 2023-05-30 RX ORDER — ONDANSETRON 4 MG/1
TABLET, ORALLY DISINTEGRATING ORAL
COMMUNITY
Start: 2023-05-25

## 2023-05-30 RX ORDER — AMLODIPINE BESYLATE 5 MG/1
5 TABLET ORAL DAILY
COMMUNITY
Start: 2023-02-22 | End: 2023-05-30 | Stop reason: ALTCHOICE

## 2023-05-30 RX ORDER — DULOXETIN HYDROCHLORIDE 60 MG/1
CAPSULE, DELAYED RELEASE ORAL
COMMUNITY
Start: 2023-04-23

## 2023-05-30 RX ORDER — ROSUVASTATIN CALCIUM 5 MG/1
5 TABLET, COATED ORAL NIGHTLY
Qty: 30 TABLET | Refills: 3 | Status: SHIPPED | OUTPATIENT
Start: 2023-05-30

## 2023-05-30 RX ORDER — SEMAGLUTIDE 0.25 MG/.5ML
INJECTION, SOLUTION SUBCUTANEOUS
COMMUNITY
Start: 2023-04-10 | End: 2023-05-30 | Stop reason: ALTCHOICE

## 2023-05-30 SDOH — ECONOMIC STABILITY: FOOD INSECURITY: WITHIN THE PAST 12 MONTHS, THE FOOD YOU BOUGHT JUST DIDN'T LAST AND YOU DIDN'T HAVE MONEY TO GET MORE.: NEVER TRUE

## 2023-05-30 SDOH — ECONOMIC STABILITY: INCOME INSECURITY: HOW HARD IS IT FOR YOU TO PAY FOR THE VERY BASICS LIKE FOOD, HOUSING, MEDICAL CARE, AND HEATING?: NOT HARD AT ALL

## 2023-05-30 SDOH — ECONOMIC STABILITY: FOOD INSECURITY: WITHIN THE PAST 12 MONTHS, YOU WORRIED THAT YOUR FOOD WOULD RUN OUT BEFORE YOU GOT MONEY TO BUY MORE.: NEVER TRUE

## 2023-05-30 SDOH — ECONOMIC STABILITY: HOUSING INSECURITY
IN THE LAST 12 MONTHS, WAS THERE A TIME WHEN YOU DID NOT HAVE A STEADY PLACE TO SLEEP OR SLEPT IN A SHELTER (INCLUDING NOW)?: NO

## 2023-05-30 ASSESSMENT — PATIENT HEALTH QUESTIONNAIRE - PHQ9
6. FEELING BAD ABOUT YOURSELF - OR THAT YOU ARE A FAILURE OR HAVE LET YOURSELF OR YOUR FAMILY DOWN: 0
7. TROUBLE CONCENTRATING ON THINGS, SUCH AS READING THE NEWSPAPER OR WATCHING TELEVISION: 0
SUM OF ALL RESPONSES TO PHQ QUESTIONS 1-9: 0
3. TROUBLE FALLING OR STAYING ASLEEP: 0
8. MOVING OR SPEAKING SO SLOWLY THAT OTHER PEOPLE COULD HAVE NOTICED. OR THE OPPOSITE, BEING SO FIGETY OR RESTLESS THAT YOU HAVE BEEN MOVING AROUND A LOT MORE THAN USUAL: 0
SUM OF ALL RESPONSES TO PHQ9 QUESTIONS 1 & 2: 0
2. FEELING DOWN, DEPRESSED OR HOPELESS: 0
9. THOUGHTS THAT YOU WOULD BE BETTER OFF DEAD, OR OF HURTING YOURSELF: 0
5. POOR APPETITE OR OVEREATING: 0
SUM OF ALL RESPONSES TO PHQ QUESTIONS 1-9: 0
1. LITTLE INTEREST OR PLEASURE IN DOING THINGS: 0
SUM OF ALL RESPONSES TO PHQ QUESTIONS 1-9: 0
4. FEELING TIRED OR HAVING LITTLE ENERGY: 0
10. IF YOU CHECKED OFF ANY PROBLEMS, HOW DIFFICULT HAVE THESE PROBLEMS MADE IT FOR YOU TO DO YOUR WORK, TAKE CARE OF THINGS AT HOME, OR GET ALONG WITH OTHER PEOPLE: 0
SUM OF ALL RESPONSES TO PHQ QUESTIONS 1-9: 0

## 2023-05-31 ENCOUNTER — CLINICAL DOCUMENTATION (OUTPATIENT)
Age: 58
End: 2023-05-31

## 2023-09-01 DIAGNOSIS — E66.01 SEVERE OBESITY WITH BODY MASS INDEX (BMI) OF 35.0 TO 39.9 WITH SERIOUS COMORBIDITY (HCC): ICD-10-CM

## 2023-09-07 NOTE — TELEPHONE ENCOUNTER
Patient requesting 1.7 increase new dose pended along with old one    Last OV: 5/30/23  Last labs:5/27/23  Next OV and labs: 9/28/23

## 2023-09-08 RX ORDER — SEMAGLUTIDE 1 MG/.5ML
INJECTION, SOLUTION SUBCUTANEOUS
Qty: 2 ML | Refills: 0 | OUTPATIENT
Start: 2023-09-08

## 2023-09-08 RX ORDER — SEMAGLUTIDE 1.7 MG/.75ML
1.7 INJECTION, SOLUTION SUBCUTANEOUS
Qty: 3 ML | Refills: 0 | Status: SHIPPED | OUTPATIENT
Start: 2023-09-08 | End: 2023-10-20

## 2023-09-28 ENCOUNTER — OFFICE VISIT (OUTPATIENT)
Age: 58
End: 2023-09-28
Payer: COMMERCIAL

## 2023-09-28 VITALS
RESPIRATION RATE: 16 BRPM | TEMPERATURE: 96.2 F | BODY MASS INDEX: 34.55 KG/M2 | DIASTOLIC BLOOD PRESSURE: 83 MMHG | WEIGHT: 195 LBS | SYSTOLIC BLOOD PRESSURE: 134 MMHG | OXYGEN SATURATION: 97 % | HEART RATE: 87 BPM | HEIGHT: 63 IN

## 2023-09-28 DIAGNOSIS — Z95.1 S/P CABG X 2: ICD-10-CM

## 2023-09-28 DIAGNOSIS — E78.5 HYPERLIPIDEMIA, UNSPECIFIED HYPERLIPIDEMIA TYPE: ICD-10-CM

## 2023-09-28 DIAGNOSIS — K76.0 FATTY LIVER: ICD-10-CM

## 2023-09-28 DIAGNOSIS — I10 ESSENTIAL HYPERTENSION: ICD-10-CM

## 2023-09-28 DIAGNOSIS — R73.01 IFG (IMPAIRED FASTING GLUCOSE): ICD-10-CM

## 2023-09-28 DIAGNOSIS — M25.50 ARTHRALGIA, UNSPECIFIED JOINT: Primary | ICD-10-CM

## 2023-09-28 PROCEDURE — 99214 OFFICE O/P EST MOD 30 MIN: CPT | Performed by: INTERNAL MEDICINE

## 2023-09-28 PROCEDURE — 3079F DIAST BP 80-89 MM HG: CPT | Performed by: INTERNAL MEDICINE

## 2023-09-28 PROCEDURE — 3075F SYST BP GE 130 - 139MM HG: CPT | Performed by: INTERNAL MEDICINE

## 2023-09-28 RX ORDER — TRAMADOL HYDROCHLORIDE 50 MG/1
50 TABLET ORAL EVERY 8 HOURS PRN
Qty: 40 TABLET | Refills: 0 | Status: SHIPPED | OUTPATIENT
Start: 2023-09-28 | End: 2023-10-12

## 2023-09-28 SDOH — ECONOMIC STABILITY: INCOME INSECURITY: HOW HARD IS IT FOR YOU TO PAY FOR THE VERY BASICS LIKE FOOD, HOUSING, MEDICAL CARE, AND HEATING?: NOT HARD AT ALL

## 2023-09-28 SDOH — ECONOMIC STABILITY: FOOD INSECURITY: WITHIN THE PAST 12 MONTHS, THE FOOD YOU BOUGHT JUST DIDN'T LAST AND YOU DIDN'T HAVE MONEY TO GET MORE.: NEVER TRUE

## 2023-09-28 SDOH — ECONOMIC STABILITY: FOOD INSECURITY: WITHIN THE PAST 12 MONTHS, YOU WORRIED THAT YOUR FOOD WOULD RUN OUT BEFORE YOU GOT MONEY TO BUY MORE.: NEVER TRUE

## 2023-09-28 ASSESSMENT — PATIENT HEALTH QUESTIONNAIRE - PHQ9
SUM OF ALL RESPONSES TO PHQ9 QUESTIONS 1 & 2: 0
2. FEELING DOWN, DEPRESSED OR HOPELESS: 0
8. MOVING OR SPEAKING SO SLOWLY THAT OTHER PEOPLE COULD HAVE NOTICED. OR THE OPPOSITE, BEING SO FIGETY OR RESTLESS THAT YOU HAVE BEEN MOVING AROUND A LOT MORE THAN USUAL: 0
5. POOR APPETITE OR OVEREATING: 0
9. THOUGHTS THAT YOU WOULD BE BETTER OFF DEAD, OR OF HURTING YOURSELF: 0
SUM OF ALL RESPONSES TO PHQ QUESTIONS 1-9: 0
SUM OF ALL RESPONSES TO PHQ QUESTIONS 1-9: 0
3. TROUBLE FALLING OR STAYING ASLEEP: 0
SUM OF ALL RESPONSES TO PHQ QUESTIONS 1-9: 0
7. TROUBLE CONCENTRATING ON THINGS, SUCH AS READING THE NEWSPAPER OR WATCHING TELEVISION: 0
10. IF YOU CHECKED OFF ANY PROBLEMS, HOW DIFFICULT HAVE THESE PROBLEMS MADE IT FOR YOU TO DO YOUR WORK, TAKE CARE OF THINGS AT HOME, OR GET ALONG WITH OTHER PEOPLE: 0
4. FEELING TIRED OR HAVING LITTLE ENERGY: 0
6. FEELING BAD ABOUT YOURSELF - OR THAT YOU ARE A FAILURE OR HAVE LET YOURSELF OR YOUR FAMILY DOWN: 0
SUM OF ALL RESPONSES TO PHQ QUESTIONS 1-9: 0
1. LITTLE INTEREST OR PLEASURE IN DOING THINGS: 0

## 2023-09-28 NOTE — PROGRESS NOTES
Chief Complaint   Patient presents with    Follow-up         Verona Dari Bo presents today for   Chief Complaint   Patient presents with    Follow-up           1. \"Have you been to the ER, urgent care clinic since your last visit? Hospitalized since your last visit? \" no    2. \"Have you seen or consulted any other health care providers outside of the [address] [place] [address] since your last visit? \" no     3. For patients aged 43-73: Has the patient had a colonoscopy / FIT/ Cologuard? No      If the patient is female:    4. For patients aged 43-66: Has the patient had a mammogram within the past 2 years? Yes - no Care Gap present      5. For patients aged 21-65: Has the patient had a pap smear?  No
retic 1.1, mma 143  From 9/21 showed   gluc 102, cr 0.64, gfr>60,  alt 70, hba1c 5.9, umar 11,   chol 217, tg 88,   hdl 68, ldl-c 131  From 7/22 showed   gluc 112, cr 0.58, gfr>60,  alt 60, hba1c 6.1,      chol 267, tg 125, hdl 58, ldl-c 184, wbc 5.3, hb 11.7, plt 253, ast 31, ap 99, tb 0.4, hep b-, cerulo 20, ama neg  From 9/22 showed   gluc 121, cr 0.60, gfr>60,  alt 61, hba1c 6.2,               tsh 1.03  From 5/23 showed   gluc 106, cr 0.66, gfr>60,  alt 37, hba1c 6.0,      chol 185, tg 130, hdl 66, ldl-c 93,   wbc 5.8, hb 12.1, plt 283  From 9/23 showed            chol 190, tg 116, hdl 60, ldl-c 106    No results found for this or any previous visit (from the past 2160 hour(s)). We reviewed the patient's labs from the last several visits to point out trends in the numbers      Patient Active Problem List   Diagnosis    Depression    Myofascial pain    Severe obesity with body mass index (BMI) of 35.0 to 39.9 with serious comorbidity ([address])    Hyperlipidemia    IFG (impaired fasting glucose)    Essential hypertension    Osteoarthritis of right knee    RYAN (obstructive sleep apnea)    Gastroesophageal reflux disease without esophagitis    S/P CABG x 2    Fatty liver       ASSESSMENT AND PLAN  1. Obesity. Lifestyle and dietary measures. Portion control. She wants to stay on 1.7mg dosing for now  2. Depression. Continue current  3. Hypertension. Controlled on current regimen. 4.  Cervical disc disease, myofascial pain syndrome, OA. F/U with her specialists. Requesting tramadol script  5. RYAN/PLMD.  F/U Dr. Syed Gamboa  6. Familial hypercholesterolemia. Not at target on inclisiran and zetia; she will talk to Dr Lb Fatima about mgmt, possibly refer to lipidologist?  7. IFG. Wt loss, dietary and lifestyle measures. Continue metformin and wegovy current dosing  8. Fatty liver. Wt loss as she is doing  9.   She will scheduled follow up colo herself        RTC 3/24    Above conditions discussed at length and

## 2023-10-20 RX ORDER — SEMAGLUTIDE 1.7 MG/.75ML
INJECTION, SOLUTION SUBCUTANEOUS
Qty: 3 ML | Refills: 3 | Status: SHIPPED | OUTPATIENT
Start: 2023-10-20

## 2024-01-22 ENCOUNTER — TELEPHONE (OUTPATIENT)
Age: 59
End: 2024-01-22

## 2024-01-22 DIAGNOSIS — E66.01 SEVERE OBESITY WITH BODY MASS INDEX (BMI) OF 35.0 TO 39.9 WITH SERIOUS COMORBIDITY (HCC): Primary | ICD-10-CM

## 2024-01-22 NOTE — TELEPHONE ENCOUNTER
----- Message from Flower Bo sent at 1/22/2024  9:46 AM EST -----  Regarding: Wegovy  Contact: [phone]  The Wegovy 2.4mg is too much for me. I vomit and feel horrible after taking that dose. could I go back to the 1.7mg dose?

## 2024-01-23 NOTE — TELEPHONE ENCOUNTER
Ellevation message sent to patient with information about requested medication.    No further action required.

## 2024-02-24 ENCOUNTER — HOSPITAL ENCOUNTER (OUTPATIENT)
Facility: HOSPITAL | Age: 59
Discharge: HOME | End: 2024-02-27
Payer: COMMERCIAL

## 2024-02-24 DIAGNOSIS — E78.01 FAMILIAL HYPERCHOLESTEROLEMIA: ICD-10-CM

## 2024-02-24 DIAGNOSIS — R73.01 IFG (IMPAIRED FASTING GLUCOSE): ICD-10-CM

## 2024-02-24 LAB
ALBUMIN SERPL-MCNC: 3.7 G/DL (ref 3.4–5)
ALBUMIN/GLOB SERPL: 1.2 (ref 0.8–1.7)
ALP SERPL-CCNC: 90 U/L (ref 45–117)
ALT SERPL-CCNC: 17 U/L (ref 13–56)
ANION GAP SERPL CALC-SCNC: 0 MMOL/L (ref 3–18)
AST SERPL-CCNC: 12 U/L (ref 10–38)
BILIRUB SERPL-MCNC: 0.4 MG/DL (ref 0.2–1)
BUN SERPL-MCNC: 10 MG/DL (ref 7–18)
BUN/CREAT SERPL: 16 (ref 12–20)
CALCIUM SERPL-MCNC: 9.3 MG/DL (ref 8.5–10.1)
CHLORIDE SERPL-SCNC: 110 MMOL/L (ref 100–111)
CHOLEST SERPL-MCNC: 137 MG/DL
CO2 SERPL-SCNC: 32 MMOL/L (ref 21–32)
CREAT SERPL-MCNC: 0.61 MG/DL (ref 0.6–1.3)
GLOBULIN SER CALC-MCNC: 3.2 G/DL (ref 2–4)
GLUCOSE SERPL-MCNC: 83 MG/DL (ref 74–99)
HBA1C MFR BLD: 5.3 % (ref 4.2–5.6)
HDLC SERPL-MCNC: 66 MG/DL (ref 40–60)
HDLC SERPL: 2.1 (ref 0–5)
LDLC SERPL CALC-MCNC: 48.4 MG/DL (ref 0–100)
LIPID PANEL: ABNORMAL
POTASSIUM SERPL-SCNC: 4.6 MMOL/L (ref 3.5–5.5)
PROT SERPL-MCNC: 6.9 G/DL (ref 6.4–8.2)
SODIUM SERPL-SCNC: 142 MMOL/L (ref 136–145)
TRIGL SERPL-MCNC: 113 MG/DL
VLDLC SERPL CALC-MCNC: 22.6 MG/DL

## 2024-02-24 PROCEDURE — 83036 HEMOGLOBIN GLYCOSYLATED A1C: CPT

## 2024-02-24 PROCEDURE — 80061 LIPID PANEL: CPT

## 2024-02-24 PROCEDURE — 80053 COMPREHEN METABOLIC PANEL: CPT

## 2024-02-24 PROCEDURE — 36415 COLL VENOUS BLD VENIPUNCTURE: CPT

## 2024-02-26 ENCOUNTER — HOSPITAL ENCOUNTER (OUTPATIENT)
Facility: HOSPITAL | Age: 59
Discharge: HOME | End: 2024-02-29
Payer: COMMERCIAL

## 2024-02-26 ENCOUNTER — OFFICE VISIT (OUTPATIENT)
Age: 59
End: 2024-02-26
Payer: COMMERCIAL

## 2024-02-26 VITALS — BODY MASS INDEX: 32.6 KG/M2 | HEIGHT: 63 IN | WEIGHT: 184 LBS

## 2024-02-26 VITALS
TEMPERATURE: 98.2 F | HEIGHT: 63 IN | DIASTOLIC BLOOD PRESSURE: 77 MMHG | SYSTOLIC BLOOD PRESSURE: 128 MMHG | BODY MASS INDEX: 32.6 KG/M2 | WEIGHT: 184 LBS | HEART RATE: 74 BPM | OXYGEN SATURATION: 97 %

## 2024-02-26 DIAGNOSIS — E66.01 SEVERE OBESITY WITH BODY MASS INDEX (BMI) OF 35.0 TO 39.9 WITH SERIOUS COMORBIDITY (HCC): ICD-10-CM

## 2024-02-26 DIAGNOSIS — I10 ESSENTIAL HYPERTENSION: ICD-10-CM

## 2024-02-26 DIAGNOSIS — E78.5 HYPERLIPIDEMIA, UNSPECIFIED HYPERLIPIDEMIA TYPE: ICD-10-CM

## 2024-02-26 DIAGNOSIS — G72.0 STATIN MYOPATHY: Primary | ICD-10-CM

## 2024-02-26 DIAGNOSIS — Z95.1 S/P CABG X 2: ICD-10-CM

## 2024-02-26 DIAGNOSIS — T46.6X5A STATIN MYOPATHY: Primary | ICD-10-CM

## 2024-02-26 DIAGNOSIS — Z12.31 VISIT FOR SCREENING MAMMOGRAM: ICD-10-CM

## 2024-02-26 DIAGNOSIS — Z12.11 SCREEN FOR COLON CANCER: ICD-10-CM

## 2024-02-26 DIAGNOSIS — R73.01 IFG (IMPAIRED FASTING GLUCOSE): ICD-10-CM

## 2024-02-26 DIAGNOSIS — Z01.419 ENCOUNTER FOR GYNECOLOGICAL EXAMINATION WITHOUT ABNORMAL FINDING: ICD-10-CM

## 2024-02-26 DIAGNOSIS — K76.0 FATTY LIVER: ICD-10-CM

## 2024-02-26 DIAGNOSIS — Z00.00 PHYSICAL EXAM: ICD-10-CM

## 2024-02-26 PROCEDURE — 77080 DXA BONE DENSITY AXIAL: CPT

## 2024-02-26 PROCEDURE — 3078F DIAST BP <80 MM HG: CPT | Performed by: INTERNAL MEDICINE

## 2024-02-26 PROCEDURE — 3074F SYST BP LT 130 MM HG: CPT | Performed by: INTERNAL MEDICINE

## 2024-02-26 PROCEDURE — 77063 BREAST TOMOSYNTHESIS BI: CPT

## 2024-02-26 PROCEDURE — 99396 PREV VISIT EST AGE 40-64: CPT | Performed by: INTERNAL MEDICINE

## 2024-02-26 ASSESSMENT — PATIENT HEALTH QUESTIONNAIRE - PHQ9
8. MOVING OR SPEAKING SO SLOWLY THAT OTHER PEOPLE COULD HAVE NOTICED. OR THE OPPOSITE, BEING SO FIGETY OR RESTLESS THAT YOU HAVE BEEN MOVING AROUND A LOT MORE THAN USUAL: 0
4. FEELING TIRED OR HAVING LITTLE ENERGY: 0
5. POOR APPETITE OR OVEREATING: 0
3. TROUBLE FALLING OR STAYING ASLEEP: 0
SUM OF ALL RESPONSES TO PHQ QUESTIONS 1-9: 0
2. FEELING DOWN, DEPRESSED OR HOPELESS: 0
7. TROUBLE CONCENTRATING ON THINGS, SUCH AS READING THE NEWSPAPER OR WATCHING TELEVISION: 0
6. FEELING BAD ABOUT YOURSELF - OR THAT YOU ARE A FAILURE OR HAVE LET YOURSELF OR YOUR FAMILY DOWN: 0
9. THOUGHTS THAT YOU WOULD BE BETTER OFF DEAD, OR OF HURTING YOURSELF: 0
10. IF YOU CHECKED OFF ANY PROBLEMS, HOW DIFFICULT HAVE THESE PROBLEMS MADE IT FOR YOU TO DO YOUR WORK, TAKE CARE OF THINGS AT HOME, OR GET ALONG WITH OTHER PEOPLE: 0
SUM OF ALL RESPONSES TO PHQ QUESTIONS 1-9: 0

## 2024-02-26 NOTE — PROGRESS NOTES
Flower Bensonkike presents today for   Chief Complaint   Patient presents with    Follow-up                 1. \"Have you been to the ER, urgent care clinic since your last visit?  Hospitalized since your last visit?\" yes    2. \"Have you seen or consulted any other health care providers outside of the [facility] since your last visit?\" no     3. For patients aged 45-75: Has the patient had a colonoscopy / FIT/ Cologuard? No      If the patient is female:    4. For patients aged 40-74: Has the patient had a mammogram within the past 2 years? Yes - no Care Gap present      5. For patients aged 21-65: Has the patient had a pap smear? Yes - no Care Gap present

## 2024-03-05 NOTE — TELEPHONE ENCOUNTER
Spoke with patient she will discuss having to see a cardiologist for praluent at upcoming appointment. Pt stated she is having a right total knee replacement the end of April. She is having labs and EKG done April 3.  She was told she needed to scheduled a pre op exam. Pt is scheduled April 17 at 36 am. [FreeTextEntry1] : Plan:  - Exam improved, now wiggling toes and transferring - Needs medication refills - Needs  support - I have reached out to Meredith for assistance on her behalf - I provided my contact information for my new practice in [place] but provided Dr. Morrow name for follow up here at [facility] - All questions answered

## 2024-03-07 LAB — NONINV COLON CA DNA+OCC BLD SCRN STL QL: NEGATIVE

## 2024-03-20 ENCOUNTER — PATIENT MESSAGE (OUTPATIENT)
Age: 59
End: 2024-03-20

## 2024-03-20 DIAGNOSIS — R11.0 NAUSEA: Primary | ICD-10-CM

## 2024-03-20 RX ORDER — PROMETHAZINE HYDROCHLORIDE 12.5 MG/1
12.5 TABLET ORAL 4 TIMES DAILY PRN
Qty: 20 TABLET | Refills: 0 | Status: SHIPPED | OUTPATIENT
Start: 2024-03-20 | End: 2024-03-27

## 2024-03-20 NOTE — TELEPHONE ENCOUNTER
From: Flower Bo  To: Dr. Armand Blue  Sent: 3/20/2024 9:19 AM EDT  Subject: Rx request    I would like to request a Rx for Promethazine 12.5mg tablets to use PRN nausea. it works better than the Zofran. please send to [facility] in [facility]

## 2024-06-22 DIAGNOSIS — R11.0 NAUSEA: ICD-10-CM

## 2024-06-23 RX ORDER — PROMETHAZINE HYDROCHLORIDE 12.5 MG/1
12.5 TABLET ORAL 4 TIMES DAILY PRN
Qty: 20 TABLET | Refills: 0 | Status: SHIPPED | OUTPATIENT
Start: 2024-06-23 | End: 2024-06-30

## 2024-11-15 RX ORDER — METFORMIN HYDROCHLORIDE 500 MG/1
1000 TABLET, EXTENDED RELEASE ORAL
Qty: 180 TABLET | Refills: 3 | Status: SHIPPED | OUTPATIENT
Start: 2024-11-15

## 2024-11-15 NOTE — TELEPHONE ENCOUNTER
PCP: Armand Blue MD    LAST OFFICE VISIT: 02/26/2024    LAST REFILL PER CHART:  Medication:metFORMIN (GLUCOPHAGE-XR) 500 MG extended release tablet   Ordered On:12/18/2023  Instructions:TAKE 2 TABLETS BY MOUTH DAILY WITH DINNER   Dispense:180 tablets  Refills:3      Future Appointments   Date Time Provider Department Center   2/27/2025  8:00 AM Armand Blue MD [facility]

## 2024-12-06 DIAGNOSIS — R11.0 NAUSEA: ICD-10-CM

## 2024-12-07 RX ORDER — PROMETHAZINE HYDROCHLORIDE 12.5 MG/1
12.5 TABLET ORAL 4 TIMES DAILY PRN
Qty: 20 TABLET | Refills: 0 | Status: SHIPPED | OUTPATIENT
Start: 2024-12-07 | End: 2024-12-14

## 2025-01-10 ENCOUNTER — TRANSCRIBE ORDERS (OUTPATIENT)
Facility: HOSPITAL | Age: 60
End: 2025-01-10

## 2025-01-10 DIAGNOSIS — Z01.419 ENCOUNTER FOR GYNECOLOGICAL EXAMINATION WITHOUT ABNORMAL FINDING: Primary | ICD-10-CM

## 2025-02-15 ENCOUNTER — HOSPITAL ENCOUNTER (OUTPATIENT)
Facility: HOSPITAL | Age: 60
Discharge: HOME | End: 2025-02-18
Payer: COMMERCIAL

## 2025-02-15 DIAGNOSIS — Z00.00 PHYSICAL EXAM: ICD-10-CM

## 2025-02-15 LAB
ALBUMIN SERPL-MCNC: 3.8 G/DL (ref 3.4–5)
ALBUMIN/GLOB SERPL: 1.3 (ref 0.8–1.7)
ALP SERPL-CCNC: 79 U/L (ref 45–117)
ALT SERPL-CCNC: 15 U/L (ref 13–56)
ANION GAP SERPL CALC-SCNC: 4 MMOL/L (ref 3–18)
AST SERPL-CCNC: 10 U/L (ref 10–38)
BASOPHILS # BLD: 0.04 K/UL (ref 0–0.1)
BASOPHILS NFR BLD: 0.6 % (ref 0–2)
BILIRUB SERPL-MCNC: 0.5 MG/DL (ref 0.2–1)
BUN SERPL-MCNC: 14 MG/DL (ref 7–18)
BUN/CREAT SERPL: 21 (ref 12–20)
CALCIUM SERPL-MCNC: 9.2 MG/DL (ref 8.5–10.1)
CHLORIDE SERPL-SCNC: 105 MMOL/L (ref 100–111)
CHOLEST SERPL-MCNC: 160 MG/DL
CO2 SERPL-SCNC: 31 MMOL/L (ref 21–32)
CREAT SERPL-MCNC: 0.66 MG/DL (ref 0.6–1.3)
DIFFERENTIAL METHOD BLD: ABNORMAL
EOSINOPHIL # BLD: 0.1 K/UL (ref 0–0.4)
EOSINOPHIL NFR BLD: 1.6 % (ref 0–5)
ERYTHROCYTE [DISTWIDTH] IN BLOOD BY AUTOMATED COUNT: 13.4 % (ref 11.6–14.5)
GLOBULIN SER CALC-MCNC: 2.9 G/DL (ref 2–4)
GLUCOSE SERPL-MCNC: 91 MG/DL (ref 74–99)
HBA1C MFR BLD: 5.8 % (ref 4.2–5.6)
HCT VFR BLD AUTO: 40.1 % (ref 35–45)
HDLC SERPL-MCNC: 62 MG/DL (ref 40–60)
HDLC SERPL: 2.6 (ref 0–5)
HGB BLD-MCNC: 12.2 G/DL (ref 12–16)
IMM GRANULOCYTES # BLD AUTO: 0.05 K/UL (ref 0–0.04)
IMM GRANULOCYTES NFR BLD AUTO: 0.8 % (ref 0–0.5)
LDLC SERPL CALC-MCNC: 69 MG/DL (ref 0–100)
LIPID PANEL: ABNORMAL
LYMPHOCYTES # BLD: 2.44 K/UL (ref 0.9–3.6)
LYMPHOCYTES NFR BLD: 38.5 % (ref 21–52)
MCH RBC QN AUTO: 26.8 PG (ref 24–34)
MCHC RBC AUTO-ENTMCNC: 30.4 G/DL (ref 31–37)
MCV RBC AUTO: 87.9 FL (ref 78–100)
MONOCYTES # BLD: 0.42 K/UL (ref 0.05–1.2)
MONOCYTES NFR BLD: 6.6 % (ref 3–10)
NEUTS SEG # BLD: 3.28 K/UL (ref 1.8–8)
NEUTS SEG NFR BLD: 51.9 % (ref 40–73)
NRBC # BLD: 0 K/UL (ref 0–0.01)
NRBC BLD-RTO: 0 PER 100 WBC
PLATELET # BLD AUTO: 261 K/UL (ref 135–420)
PMV BLD AUTO: 10.3 FL (ref 9.2–11.8)
POTASSIUM SERPL-SCNC: 4.5 MMOL/L (ref 3.5–5.5)
PROT SERPL-MCNC: 6.7 G/DL (ref 6.4–8.2)
RBC # BLD AUTO: 4.56 M/UL (ref 4.2–5.3)
SODIUM SERPL-SCNC: 140 MMOL/L (ref 136–145)
TRIGL SERPL-MCNC: 145 MG/DL
VLDLC SERPL CALC-MCNC: 29 MG/DL
WBC # BLD AUTO: 6.3 K/UL (ref 4.6–13.2)

## 2025-02-15 PROCEDURE — 83036 HEMOGLOBIN GLYCOSYLATED A1C: CPT

## 2025-02-15 PROCEDURE — 80053 COMPREHEN METABOLIC PANEL: CPT

## 2025-02-15 PROCEDURE — 85025 COMPLETE CBC W/AUTO DIFF WBC: CPT

## 2025-02-15 PROCEDURE — 36415 COLL VENOUS BLD VENIPUNCTURE: CPT

## 2025-02-15 PROCEDURE — 80061 LIPID PANEL: CPT

## 2025-02-24 SDOH — ECONOMIC STABILITY: FOOD INSECURITY: WITHIN THE PAST 12 MONTHS, YOU WORRIED THAT YOUR FOOD WOULD RUN OUT BEFORE YOU GOT MONEY TO BUY MORE.: NEVER TRUE

## 2025-02-24 SDOH — ECONOMIC STABILITY: TRANSPORTATION INSECURITY
IN THE PAST 12 MONTHS, HAS LACK OF TRANSPORTATION KEPT YOU FROM MEETINGS, WORK, OR FROM GETTING THINGS NEEDED FOR DAILY LIVING?: NO

## 2025-02-24 SDOH — ECONOMIC STABILITY: FOOD INSECURITY: WITHIN THE PAST 12 MONTHS, THE FOOD YOU BOUGHT JUST DIDN'T LAST AND YOU DIDN'T HAVE MONEY TO GET MORE.: NEVER TRUE

## 2025-02-24 SDOH — ECONOMIC STABILITY: TRANSPORTATION INSECURITY
IN THE PAST 12 MONTHS, HAS THE LACK OF TRANSPORTATION KEPT YOU FROM MEDICAL APPOINTMENTS OR FROM GETTING MEDICATIONS?: NO

## 2025-02-24 SDOH — ECONOMIC STABILITY: INCOME INSECURITY: IN THE LAST 12 MONTHS, WAS THERE A TIME WHEN YOU WERE NOT ABLE TO PAY THE MORTGAGE OR RENT ON TIME?: NO

## 2025-02-27 ENCOUNTER — OFFICE VISIT (OUTPATIENT)
Facility: CLINIC | Age: 60
End: 2025-02-27
Payer: COMMERCIAL

## 2025-02-27 VITALS
HEART RATE: 73 BPM | OXYGEN SATURATION: 97 % | TEMPERATURE: 97.6 F | WEIGHT: 203 LBS | DIASTOLIC BLOOD PRESSURE: 82 MMHG | BODY MASS INDEX: 35.97 KG/M2 | SYSTOLIC BLOOD PRESSURE: 122 MMHG | HEIGHT: 63 IN | RESPIRATION RATE: 16 BRPM

## 2025-02-27 DIAGNOSIS — I10 ESSENTIAL HYPERTENSION: ICD-10-CM

## 2025-02-27 DIAGNOSIS — G47.33 OSA (OBSTRUCTIVE SLEEP APNEA): ICD-10-CM

## 2025-02-27 DIAGNOSIS — F32.A DEPRESSION, UNSPECIFIED DEPRESSION TYPE: ICD-10-CM

## 2025-02-27 DIAGNOSIS — Z00.00 PHYSICAL EXAM: Primary | ICD-10-CM

## 2025-02-27 DIAGNOSIS — G72.0 STATIN MYOPATHY: ICD-10-CM

## 2025-02-27 DIAGNOSIS — K76.0 METABOLIC DYSFUNCTION-ASSOCIATED STEATOTIC LIVER DISEASE (MASLD): ICD-10-CM

## 2025-02-27 DIAGNOSIS — T46.6X5A STATIN MYOPATHY: ICD-10-CM

## 2025-02-27 DIAGNOSIS — E66.01 SEVERE OBESITY WITH BODY MASS INDEX (BMI) OF 35.0 TO 39.9 WITH SERIOUS COMORBIDITY: ICD-10-CM

## 2025-02-27 DIAGNOSIS — Z95.1 S/P CABG X 2: ICD-10-CM

## 2025-02-27 DIAGNOSIS — R73.01 IFG (IMPAIRED FASTING GLUCOSE): ICD-10-CM

## 2025-02-27 DIAGNOSIS — E78.5 HYPERLIPIDEMIA, UNSPECIFIED HYPERLIPIDEMIA TYPE: ICD-10-CM

## 2025-02-27 PROCEDURE — 3074F SYST BP LT 130 MM HG: CPT | Performed by: INTERNAL MEDICINE

## 2025-02-27 PROCEDURE — 99396 PREV VISIT EST AGE 40-64: CPT | Performed by: INTERNAL MEDICINE

## 2025-02-27 PROCEDURE — 3079F DIAST BP 80-89 MM HG: CPT | Performed by: INTERNAL MEDICINE

## 2025-02-27 RX ORDER — PROMETHAZINE HYDROCHLORIDE 12.5 MG/1
1 TABLET ORAL 4 TIMES DAILY PRN
COMMUNITY

## 2025-02-27 RX ORDER — EVOLOCUMAB 140 MG/ML
140 INJECTION, SOLUTION SUBCUTANEOUS
COMMUNITY
Start: 2024-12-04

## 2025-02-27 ASSESSMENT — PATIENT HEALTH QUESTIONNAIRE - PHQ9
3. TROUBLE FALLING OR STAYING ASLEEP: NOT AT ALL
SUM OF ALL RESPONSES TO PHQ QUESTIONS 1-9: 3
SUM OF ALL RESPONSES TO PHQ QUESTIONS 1-9: 3
4. FEELING TIRED OR HAVING LITTLE ENERGY: SEVERAL DAYS
8. MOVING OR SPEAKING SO SLOWLY THAT OTHER PEOPLE COULD HAVE NOTICED. OR THE OPPOSITE, BEING SO FIGETY OR RESTLESS THAT YOU HAVE BEEN MOVING AROUND A LOT MORE THAN USUAL: NOT AT ALL
9. THOUGHTS THAT YOU WOULD BE BETTER OFF DEAD, OR OF HURTING YOURSELF: NOT AT ALL
SUM OF ALL RESPONSES TO PHQ9 QUESTIONS 1 & 2: 0
7. TROUBLE CONCENTRATING ON THINGS, SUCH AS READING THE NEWSPAPER OR WATCHING TELEVISION: NOT AT ALL
2. FEELING DOWN, DEPRESSED OR HOPELESS: NOT AT ALL
6. FEELING BAD ABOUT YOURSELF - OR THAT YOU ARE A FAILURE OR HAVE LET YOURSELF OR YOUR FAMILY DOWN: NOT AT ALL
10. IF YOU CHECKED OFF ANY PROBLEMS, HOW DIFFICULT HAVE THESE PROBLEMS MADE IT FOR YOU TO DO YOUR WORK, TAKE CARE OF THINGS AT HOME, OR GET ALONG WITH OTHER PEOPLE: NOT DIFFICULT AT ALL
SUM OF ALL RESPONSES TO PHQ QUESTIONS 1-9: 3
1. LITTLE INTEREST OR PLEASURE IN DOING THINGS: NOT AT ALL
5. POOR APPETITE OR OVEREATING: MORE THAN HALF THE DAYS
SUM OF ALL RESPONSES TO PHQ QUESTIONS 1-9: 3

## 2025-02-27 NOTE — PROGRESS NOTES
Flower Bo presents today for   Chief Complaint   Patient presents with    Annual Exam       \"Have you been to the ER, urgent care clinic since your last visit?  Hospitalized since your last visit?\"    NO    “Have you seen or consulted any other health care providers outside of [facility] since your last visit?”    YES - When: approximately 2  weeks ago.  Where and Why: [facility], pap; 5 months ago, [facility].        “Have you had a pap smear?”    YES - Where: [facility], Feb Nurse/CMA to request most recent records if not in the chart    Date of last Cervical Cancer screen (HPV or PAP): 7/17/2013           
release capsule Take 1 capsule by mouth daily    ezetimibe (ZETIA) 10 MG tablet Take 1 tablet by mouth daily    gabapentin (NEURONTIN) 300 MG capsule TAKE 3 CAPSULES IN THE MORNING AND 4 CAPSULES IN THE EVENING    lisinopril (PRINIVIL;ZESTRIL) 10 MG tablet TAKE 1 TABLET BY MOUTH EVERY DAY    metaxalone (SKELAXIN) 800 MG tablet Take 1 tablet by mouth every 8 hours as needed    metoprolol succinate (TOPROL XL) 25 MG extended release tablet Take 1 tablet by mouth    montelukast (SINGULAIR) 10 MG tablet Take 1 tablet by mouth    omeprazole (PRILOSEC) 40 MG delayed release capsule Take 1 capsule by mouth    traMADol (ULTRAM) 50 MG tablet Take 1 tablet by mouth as needed.     No current facility-administered medications for this visit.     Allergies   Allergen Reactions    Atorvastatin Myalgia    Hydrochlorothiazide Other (See Comments)     Leg cramps    Pravastatin Myalgia    Rosuvastatin Myalgia    Simvastatin Myalgia    Verapamil Other (See Comments)     Bad taste, headache    Adhesive Tape Itching, Rash and Swelling    Sulfa Antibiotics Itching and Rash     REVIEW OF SYSTEMS:  mammo 1/24, cologuard 3/24, saw Dr Pfeiffer/[place] 1/25    Vitals:    02/27/25 0812   BP: 122/82   Pulse: 73   Resp: 16   Temp: 97.6 °F (36.4 °C)   TempSrc: Temporal   SpO2: 97%   Weight: 92.1 kg (203 lb)   Height: 1.6 m (5' 3\")   A&O x3  Affect is appropriate.  Mood stable  No apparent distress  Anicteric, no JVD, adenopathy or thyromegaly.   No carotid bruits or radiated murmur  Lungs clear to auscultation, no wheezes or rales  Heart showed regular rate and rhythm. No murmur, rubs, gallops  Abdomen soft nontender, no hepatosplenomegaly or masses.   Extremities without edema.  Pulses 1-2+ symmetrically    LABS  From 5/10 showed   gluc 93,   cr 0.90,              alt 39,                        chol 195, tg 98,   hdl 72, ldl-c 103,  wbc 7.4, hb 12.4, plt 246, tsh 1.23  From 9/11 showed   gluc 95,   cr 0.62, gfr 107, alt 25,           ldl-p 1617,

## 2025-03-13 ENCOUNTER — PATIENT MESSAGE (OUTPATIENT)
Facility: CLINIC | Age: 60
End: 2025-03-13

## 2025-03-13 DIAGNOSIS — G47.33 OSA (OBSTRUCTIVE SLEEP APNEA): Primary | ICD-10-CM

## 2025-03-13 DIAGNOSIS — E66.01 SEVERE OBESITY WITH BODY MASS INDEX (BMI) OF 35.0 TO 39.9 WITH SERIOUS COMORBIDITY: ICD-10-CM

## 2025-03-17 NOTE — TELEPHONE ENCOUNTER
Zepbound sent     Diagnosis Orders   1. RYAN (obstructive sleep apnea)  tirzepatide-weight management (ZEPBOUND) 2.5 MG/0.5ML SOAJ subCUTAneous auto-injector pen      2. Severe obesity with body mass index (BMI) of 35.0 to 39.9 with serious comorbidity  tirzepatide-weight management (ZEPBOUND) 2.5 MG/0.5ML SOAJ subCUTAneous auto-injector pen

## 2025-03-19 ENCOUNTER — HOSPITAL ENCOUNTER (OUTPATIENT)
Facility: HOSPITAL | Age: 60
Discharge: HOME | End: 2025-03-22
Payer: COMMERCIAL

## 2025-03-19 VITALS — HEIGHT: 63 IN | BODY MASS INDEX: 35.97 KG/M2

## 2025-03-19 DIAGNOSIS — Z01.419 ENCOUNTER FOR GYNECOLOGICAL EXAMINATION WITHOUT ABNORMAL FINDING: ICD-10-CM

## 2025-03-19 PROCEDURE — 77063 BREAST TOMOSYNTHESIS BI: CPT

## 2025-03-24 ENCOUNTER — RESULTS FOLLOW-UP (OUTPATIENT)
Facility: HOSPITAL | Age: 60
End: 2025-03-24

## 2025-04-24 ENCOUNTER — PATIENT MESSAGE (OUTPATIENT)
Facility: CLINIC | Age: 60
End: 2025-04-24

## 2025-04-24 RX ORDER — METHYLPREDNISOLONE 4 MG/1
TABLET ORAL
Qty: 1 KIT | Refills: 0 | Status: SHIPPED | OUTPATIENT
Start: 2025-04-24 | End: 2025-04-30

## 2025-08-05 RX ORDER — ARMODAFINIL 200 MG/1
1 TABLET ORAL EVERY MORNING
COMMUNITY
Start: 2025-02-17

## 2025-08-08 ENCOUNTER — ANESTHESIA EVENT (OUTPATIENT)
Facility: HOSPITAL | Age: 60
End: 2025-08-08
Payer: COMMERCIAL

## 2025-08-11 ENCOUNTER — ANESTHESIA (OUTPATIENT)
Facility: HOSPITAL | Age: 60
End: 2025-08-11
Payer: COMMERCIAL

## 2025-08-11 ENCOUNTER — HOSPITAL ENCOUNTER (OUTPATIENT)
Facility: HOSPITAL | Age: 60
Setting detail: OUTPATIENT SURGERY
Discharge: HOME | End: 2025-08-11
Attending: INTERNAL MEDICINE | Admitting: INTERNAL MEDICINE
Payer: COMMERCIAL

## 2025-08-11 VITALS
OXYGEN SATURATION: 99 % | TEMPERATURE: 98 F | HEART RATE: 66 BPM | BODY MASS INDEX: 38.68 KG/M2 | SYSTOLIC BLOOD PRESSURE: 117 MMHG | WEIGHT: 226.6 LBS | DIASTOLIC BLOOD PRESSURE: 74 MMHG | HEIGHT: 64 IN | RESPIRATION RATE: 18 BRPM

## 2025-08-11 LAB — GLUCOSE BLD STRIP.AUTO-MCNC: 125 MG/DL (ref 70–110)

## 2025-08-11 PROCEDURE — 7100000010 HC PHASE II RECOVERY - FIRST 15 MIN: Performed by: INTERNAL MEDICINE

## 2025-08-11 PROCEDURE — 6360000002 HC RX W HCPCS: Performed by: NURSE ANESTHETIST, CERTIFIED REGISTERED

## 2025-08-11 PROCEDURE — 3600007502: Performed by: INTERNAL MEDICINE

## 2025-08-11 PROCEDURE — 3700000000 HC ANESTHESIA ATTENDED CARE: Performed by: INTERNAL MEDICINE

## 2025-08-11 PROCEDURE — 7100000000 HC PACU RECOVERY - FIRST 15 MIN: Performed by: INTERNAL MEDICINE

## 2025-08-11 PROCEDURE — 2709999900 HC NON-CHARGEABLE SUPPLY: Performed by: INTERNAL MEDICINE

## 2025-08-11 PROCEDURE — 88305 TISSUE EXAM BY PATHOLOGIST: CPT

## 2025-08-11 PROCEDURE — 2580000003 HC RX 258: Performed by: NURSE ANESTHETIST, CERTIFIED REGISTERED

## 2025-08-11 PROCEDURE — 82962 GLUCOSE BLOOD TEST: CPT

## 2025-08-11 RX ORDER — SODIUM CHLORIDE 0.9 % (FLUSH) 0.9 %
5-40 SYRINGE (ML) INJECTION EVERY 12 HOURS SCHEDULED
Status: DISCONTINUED | OUTPATIENT
Start: 2025-08-11 | End: 2025-08-11 | Stop reason: HOSPADM

## 2025-08-11 RX ORDER — DEXTROSE MONOHYDRATE 100 MG/ML
INJECTION, SOLUTION INTRAVENOUS CONTINUOUS PRN
Status: DISCONTINUED | OUTPATIENT
Start: 2025-08-11 | End: 2025-08-11 | Stop reason: HOSPADM

## 2025-08-11 RX ORDER — INSULIN LISPRO 100 [IU]/ML
0-10 INJECTION, SOLUTION INTRAVENOUS; SUBCUTANEOUS ONCE
Status: DISCONTINUED | OUTPATIENT
Start: 2025-08-11 | End: 2025-08-11 | Stop reason: HOSPADM

## 2025-08-11 RX ORDER — SODIUM CHLORIDE 0.9 % (FLUSH) 0.9 %
5-40 SYRINGE (ML) INJECTION PRN
Status: DISCONTINUED | OUTPATIENT
Start: 2025-08-11 | End: 2025-08-11 | Stop reason: HOSPADM

## 2025-08-11 RX ORDER — LIDOCAINE HYDROCHLORIDE 20 MG/ML
INJECTION, SOLUTION EPIDURAL; INFILTRATION; INTRACAUDAL; PERINEURAL
Status: DISCONTINUED | OUTPATIENT
Start: 2025-08-11 | End: 2025-08-11 | Stop reason: SDUPTHER

## 2025-08-11 RX ORDER — PROPOFOL 10 MG/ML
INJECTION, EMULSION INTRAVENOUS
Status: DISCONTINUED | OUTPATIENT
Start: 2025-08-11 | End: 2025-08-11 | Stop reason: SDUPTHER

## 2025-08-11 RX ORDER — SODIUM CHLORIDE 9 MG/ML
INJECTION, SOLUTION INTRAVENOUS PRN
Status: DISCONTINUED | OUTPATIENT
Start: 2025-08-11 | End: 2025-08-11 | Stop reason: HOSPADM

## 2025-08-11 RX ORDER — SODIUM CHLORIDE, SODIUM LACTATE, POTASSIUM CHLORIDE, CALCIUM CHLORIDE 600; 310; 30; 20 MG/100ML; MG/100ML; MG/100ML; MG/100ML
INJECTION, SOLUTION INTRAVENOUS CONTINUOUS
Status: DISCONTINUED | OUTPATIENT
Start: 2025-08-11 | End: 2025-08-11 | Stop reason: HOSPADM

## 2025-08-11 RX ADMIN — SODIUM CHLORIDE, SODIUM LACTATE, POTASSIUM CHLORIDE, AND CALCIUM CHLORIDE: .6; .31; .03; .02 INJECTION, SOLUTION INTRAVENOUS at 09:35

## 2025-08-11 RX ADMIN — PROPOFOL 30 MG: 10 INJECTION, EMULSION INTRAVENOUS at 10:08

## 2025-08-11 RX ADMIN — LIDOCAINE HYDROCHLORIDE 100 MG: 20 INJECTION, SOLUTION EPIDURAL; INFILTRATION; INTRACAUDAL; PERINEURAL at 10:04

## 2025-08-11 RX ADMIN — PROPOFOL 100 MG: 10 INJECTION, EMULSION INTRAVENOUS at 10:04

## 2025-08-11 ASSESSMENT — PAIN - FUNCTIONAL ASSESSMENT
PAIN_FUNCTIONAL_ASSESSMENT: 0-10

## (undated) DEVICE — SPONGE GZ 16 PLY WVN COT 4INX4IN STERIL

## (undated) DEVICE — BASIN EMSIS 16OZ GRAPHITE PLAS KID SHP MOLD GRAD FOR ORAL

## (undated) DEVICE — SYRINGE MED 25GA 3ML L5/8IN SUBQ PLAS W/ DETACH NDL SFTY

## (undated) DEVICE — SET TBNG CAP ENDOSCP PMP BK FLO VLV DISP ERBEFLO

## (undated) DEVICE — SYRINGE MED 50ML LUERSLIP TIP

## (undated) DEVICE — CANNULA NSL AD TBNG L14FT STD PVC O2 CRV CONN NONFLARED NSL

## (undated) DEVICE — TUBING SUCT L12FT DIA0.187IN CLR W/ MAXI-GRIP AND M/M CONN RSFH ONLY

## (undated) DEVICE — UNDERPAD INCONT W23XL36IN STD BLU POLYPR BK FLUF SFT

## (undated) DEVICE — SOLUTION IRRIG 1000ML H2O STRL BLT

## (undated) DEVICE — GLOVE SURG SZ 7.5 L11.8IN FNGR THK9.1MIL CUF THK6.7MIL CRM

## (undated) DEVICE — FORCEPS BX L240CM JAW DIA2.4MM ORNG L CAP W/ NDL DISP RAD

## (undated) DEVICE — BITE BLOCK ENDOSCP UNIV AD 6 TO 9.4 MM

## (undated) DEVICE — CATHETER SUCT TR FL TIP 14FR W/ O CTRL

## (undated) DEVICE — LINER SUCT CANSTR 3000CC PLAS SFT PRE ASSEMB W/OUT TBNG W/

## (undated) DEVICE — HANDLE YANK HI CAP SMOOTH